# Patient Record
Sex: MALE | Race: BLACK OR AFRICAN AMERICAN | NOT HISPANIC OR LATINO | Employment: FULL TIME | URBAN - METROPOLITAN AREA
[De-identification: names, ages, dates, MRNs, and addresses within clinical notes are randomized per-mention and may not be internally consistent; named-entity substitution may affect disease eponyms.]

---

## 2022-11-21 ENCOUNTER — HOSPITAL ENCOUNTER (OUTPATIENT)
Facility: OTHER | Age: 47
Discharge: HOME | End: 2022-11-23
Attending: EMERGENCY MEDICINE | Admitting: HOSPITALIST
Payer: MEDICAID

## 2022-11-21 DIAGNOSIS — I63.81 LEFT SIDED LACUNAR STROKE: Primary | ICD-10-CM

## 2022-11-21 DIAGNOSIS — I63.9 STROKE: ICD-10-CM

## 2022-11-21 DIAGNOSIS — I10 UNCONTROLLED HYPERTENSION: ICD-10-CM

## 2022-11-21 DIAGNOSIS — I63.9 CVA (CEREBRAL VASCULAR ACCIDENT): ICD-10-CM

## 2022-11-21 DIAGNOSIS — R76.8 HEPATITIS C ANTIBODY POSITIVE IN BLOOD: ICD-10-CM

## 2022-11-21 LAB
ALBUMIN SERPL BCP-MCNC: 3.5 G/DL (ref 3.5–5.2)
ALP SERPL-CCNC: 64 U/L (ref 55–135)
ALT SERPL W/O P-5'-P-CCNC: 51 U/L (ref 10–44)
ANION GAP SERPL CALC-SCNC: 10 MMOL/L (ref 8–16)
AST SERPL-CCNC: 43 U/L (ref 10–40)
BASOPHILS # BLD AUTO: 0.04 K/UL (ref 0–0.2)
BASOPHILS NFR BLD: 0.5 % (ref 0–1.9)
BILIRUB SERPL-MCNC: 0.6 MG/DL (ref 0.1–1)
BUN SERPL-MCNC: 14 MG/DL (ref 6–20)
CALCIUM SERPL-MCNC: 8.9 MG/DL (ref 8.7–10.5)
CHLORIDE SERPL-SCNC: 106 MMOL/L (ref 95–110)
CO2 SERPL-SCNC: 25 MMOL/L (ref 23–29)
CREAT SERPL-MCNC: 1.4 MG/DL (ref 0.5–1.4)
DIFFERENTIAL METHOD: NORMAL
EOSINOPHIL # BLD AUTO: 0.1 K/UL (ref 0–0.5)
EOSINOPHIL NFR BLD: 1.3 % (ref 0–8)
ERYTHROCYTE [DISTWIDTH] IN BLOOD BY AUTOMATED COUNT: 13.2 % (ref 11.5–14.5)
EST. GFR  (NO RACE VARIABLE): >60 ML/MIN/1.73 M^2
GLUCOSE SERPL-MCNC: 104 MG/DL (ref 70–110)
HCT VFR BLD AUTO: 47.4 % (ref 40–54)
HCV AB SERPL QL IA: POSITIVE
HGB BLD-MCNC: 15.9 G/DL (ref 14–18)
HIV 1+2 AB+HIV1 P24 AG SERPL QL IA: NEGATIVE
IMM GRANULOCYTES # BLD AUTO: 0.01 K/UL (ref 0–0.04)
IMM GRANULOCYTES NFR BLD AUTO: 0.1 % (ref 0–0.5)
LYMPHOCYTES # BLD AUTO: 2.6 K/UL (ref 1–4.8)
LYMPHOCYTES NFR BLD: 30 % (ref 18–48)
MAGNESIUM SERPL-MCNC: 1.7 MG/DL (ref 1.6–2.6)
MCH RBC QN AUTO: 29 PG (ref 27–31)
MCHC RBC AUTO-ENTMCNC: 33.5 G/DL (ref 32–36)
MCV RBC AUTO: 87 FL (ref 82–98)
MONOCYTES # BLD AUTO: 0.8 K/UL (ref 0.3–1)
MONOCYTES NFR BLD: 9.5 % (ref 4–15)
NEUTROPHILS # BLD AUTO: 5.2 K/UL (ref 1.8–7.7)
NEUTROPHILS NFR BLD: 58.6 % (ref 38–73)
NRBC BLD-RTO: 0 /100 WBC
PLATELET # BLD AUTO: 159 K/UL (ref 150–450)
PMV BLD AUTO: 10.8 FL (ref 9.2–12.9)
POCT GLUCOSE: 100 MG/DL (ref 70–110)
POTASSIUM SERPL-SCNC: 3.8 MMOL/L (ref 3.5–5.1)
PROT SERPL-MCNC: 7.1 G/DL (ref 6–8.4)
RBC # BLD AUTO: 5.48 M/UL (ref 4.6–6.2)
SODIUM SERPL-SCNC: 141 MMOL/L (ref 136–145)
TROPONIN I SERPL DL<=0.01 NG/ML-MCNC: 0.02 NG/ML (ref 0–0.03)
TSH SERPL DL<=0.005 MIU/L-ACNC: 0.86 UIU/ML (ref 0.4–4)
WBC # BLD AUTO: 8.78 K/UL (ref 3.9–12.7)

## 2022-11-21 PROCEDURE — 25500020 PHARM REV CODE 255: Performed by: EMERGENCY MEDICINE

## 2022-11-21 PROCEDURE — 83735 ASSAY OF MAGNESIUM: CPT | Performed by: EMERGENCY MEDICINE

## 2022-11-21 PROCEDURE — 84484 ASSAY OF TROPONIN QUANT: CPT | Performed by: EMERGENCY MEDICINE

## 2022-11-21 PROCEDURE — 93010 EKG 12-LEAD: ICD-10-PCS | Mod: ,,, | Performed by: INTERNAL MEDICINE

## 2022-11-21 PROCEDURE — 36415 COLL VENOUS BLD VENIPUNCTURE: CPT | Performed by: EMERGENCY MEDICINE

## 2022-11-21 PROCEDURE — 93005 ELECTROCARDIOGRAM TRACING: CPT

## 2022-11-21 PROCEDURE — 84443 ASSAY THYROID STIM HORMONE: CPT | Performed by: EMERGENCY MEDICINE

## 2022-11-21 PROCEDURE — 80053 COMPREHEN METABOLIC PANEL: CPT | Performed by: EMERGENCY MEDICINE

## 2022-11-21 PROCEDURE — 86803 HEPATITIS C AB TEST: CPT | Performed by: EMERGENCY MEDICINE

## 2022-11-21 PROCEDURE — 87389 HIV-1 AG W/HIV-1&-2 AB AG IA: CPT | Performed by: EMERGENCY MEDICINE

## 2022-11-21 PROCEDURE — 25000003 PHARM REV CODE 250: Performed by: EMERGENCY MEDICINE

## 2022-11-21 PROCEDURE — 80061 LIPID PANEL: CPT | Performed by: EMERGENCY MEDICINE

## 2022-11-21 PROCEDURE — 93010 ELECTROCARDIOGRAM REPORT: CPT | Mod: ,,, | Performed by: INTERNAL MEDICINE

## 2022-11-21 PROCEDURE — 83036 HEMOGLOBIN GLYCOSYLATED A1C: CPT | Performed by: EMERGENCY MEDICINE

## 2022-11-21 PROCEDURE — 85025 COMPLETE CBC W/AUTO DIFF WBC: CPT | Performed by: EMERGENCY MEDICINE

## 2022-11-21 RX ORDER — ASPIRIN 325 MG
325 TABLET ORAL
Status: COMPLETED | OUTPATIENT
Start: 2022-11-21 | End: 2022-11-21

## 2022-11-21 RX ORDER — GADOBUTROL 604.72 MG/ML
10 INJECTION INTRAVENOUS
Status: COMPLETED | OUTPATIENT
Start: 2022-11-22 | End: 2022-11-21

## 2022-11-21 RX ORDER — ALPRAZOLAM 0.5 MG/1
0.5 TABLET ORAL
Status: DISCONTINUED | OUTPATIENT
Start: 2022-11-21 | End: 2022-11-22

## 2022-11-21 RX ORDER — CLOPIDOGREL 300 MG/1
300 TABLET, FILM COATED ORAL
Status: COMPLETED | OUTPATIENT
Start: 2022-11-21 | End: 2022-11-21

## 2022-11-21 RX ADMIN — CLOPIDOGREL BISULFATE 300 MG: 300 TABLET, FILM COATED ORAL at 11:11

## 2022-11-21 RX ADMIN — ASPIRIN 325 MG ORAL TABLET 325 MG: 325 PILL ORAL at 11:11

## 2022-11-21 RX ADMIN — GADOBUTROL 10 ML: 604.72 INJECTION INTRAVENOUS at 11:11

## 2022-11-21 RX ADMIN — IOHEXOL 100 ML: 350 INJECTION, SOLUTION INTRAVENOUS at 10:11

## 2022-11-22 PROBLEM — Z72.0 TOBACCO ABUSE: Status: ACTIVE | Noted: 2022-11-22

## 2022-11-22 PROBLEM — R29.898 RIGHT LEG WEAKNESS: Status: ACTIVE | Noted: 2022-11-22

## 2022-11-22 PROBLEM — I63.81 ACUTE THALAMIC INFARCTION: Status: ACTIVE | Noted: 2022-11-22

## 2022-11-22 PROBLEM — I10 PRIMARY HYPERTENSION: Status: ACTIVE | Noted: 2022-11-22

## 2022-11-22 LAB
APTT BLDCRRT: 30.3 SEC (ref 21–32)
AV INDEX (PROSTH): 0.81
AV MEAN GRADIENT: 5 MMHG
AV PEAK GRADIENT: 10 MMHG
AV VALVE AREA: 3.37 CM2
AV VELOCITY RATIO: 0.71
BSA FOR ECHO PROCEDURE: 2.04 M2
CHOLEST SERPL-MCNC: 172 MG/DL (ref 120–199)
CHOLEST/HDLC SERPL: 4 {RATIO} (ref 2–5)
CK MB SERPL-MCNC: 1.9 NG/ML (ref 0.1–6.5)
CK MB SERPL-RTO: 0.9 % (ref 0–5)
CK SERPL-CCNC: 201 U/L (ref 20–200)
CV ECHO LV RWT: 0.39 CM
DOP CALC AO PEAK VEL: 1.6 M/S
DOP CALC AO VTI: 29.7 CM
DOP CALC LVOT AREA: 4.2 CM2
DOP CALC LVOT DIAMETER: 2.3 CM
DOP CALC LVOT PEAK VEL: 1.13 M/S
DOP CALC LVOT STROKE VOLUME: 100.08 CM3
DOP CALCLVOT PEAK VEL VTI: 24.1 CM
E WAVE DECELERATION TIME: 428.47 MSEC
E/A RATIO: 0.53
E/E' RATIO: 18 M/S
ECHO LV POSTERIOR WALL: 0.92 CM (ref 0.6–1.1)
EJECTION FRACTION: 55 %
ESTIMATED AVG GLUCOSE: 103 MG/DL (ref 68–131)
FRACTIONAL SHORTENING: 28 % (ref 28–44)
HBA1C MFR BLD: 5.2 % (ref 4–5.6)
HDLC SERPL-MCNC: 43 MG/DL (ref 40–75)
HDLC SERPL: 25 % (ref 20–50)
INR PPP: 1 (ref 0.8–1.2)
INTERVENTRICULAR SEPTUM: 0.95 CM (ref 0.6–1.1)
IVC DIAMETER: 1.66 CM
IVRT: 95.15 MSEC
LA MAJOR: 5.99 CM
LA MINOR: 5.82 CM
LA WIDTH: 4.1 CM
LDLC SERPL CALC-MCNC: 104 MG/DL (ref 63–159)
LEFT ATRIUM SIZE: 4.7 CM
LEFT ATRIUM VOLUME INDEX MOD: 34.8 ML/M2
LEFT ATRIUM VOLUME INDEX: 47.4 ML/M2
LEFT ATRIUM VOLUME MOD: 71 CM3
LEFT ATRIUM VOLUME: 96.7 CM3
LEFT INTERNAL DIMENSION IN SYSTOLE: 3.42 CM (ref 2.1–4)
LEFT VENTRICLE DIASTOLIC VOLUME INDEX: 51.11 ML/M2
LEFT VENTRICLE DIASTOLIC VOLUME: 104.26 ML
LEFT VENTRICLE MASS INDEX: 75 G/M2
LEFT VENTRICLE SYSTOLIC VOLUME INDEX: 23.6 ML/M2
LEFT VENTRICLE SYSTOLIC VOLUME: 48.16 ML
LEFT VENTRICULAR INTERNAL DIMENSION IN DIASTOLE: 4.74 CM (ref 3.5–6)
LEFT VENTRICULAR MASS: 152.28 G
LV LATERAL E/E' RATIO: 22.5 M/S
LV SEPTAL E/E' RATIO: 15 M/S
LVOT MG: 2.66 MMHG
LVOT MV: 0.77 CM/S
MV PEAK A VEL: 0.85 M/S
MV PEAK E VEL: 0.45 M/S
MV STENOSIS PRESSURE HALF TIME: 114.03 MS
MV VALVE AREA P 1/2 METHOD: 1.93 CM2
NONHDLC SERPL-MCNC: 129 MG/DL
PISA TR MAX VEL: 2 M/S
PROTHROMBIN TIME: 10.6 SEC (ref 9–12.5)
PV PEAK VELOCITY: 1.02 CM/S
RA MAJOR: 5.74 CM
RA WIDTH: 3.8 CM
TDI LATERAL: 0.02 M/S
TDI SEPTAL: 0.03 M/S
TDI: 0.03 M/S
TR MAX PG: 16 MMHG
TRIGL SERPL-MCNC: 125 MG/DL (ref 30–150)
TROPONIN I SERPL DL<=0.01 NG/ML-MCNC: 0.02 NG/ML (ref 0–0.03)

## 2022-11-22 PROCEDURE — G0378 HOSPITAL OBSERVATION PER HR: HCPCS

## 2022-11-22 PROCEDURE — 92610 EVALUATE SWALLOWING FUNCTION: CPT

## 2022-11-22 PROCEDURE — 97165 OT EVAL LOW COMPLEX 30 MIN: CPT

## 2022-11-22 PROCEDURE — 84484 ASSAY OF TROPONIN QUANT: CPT | Performed by: NURSE PRACTITIONER

## 2022-11-22 PROCEDURE — 36415 COLL VENOUS BLD VENIPUNCTURE: CPT | Performed by: EMERGENCY MEDICINE

## 2022-11-22 PROCEDURE — 25500020 PHARM REV CODE 255: Performed by: EMERGENCY MEDICINE

## 2022-11-22 PROCEDURE — 99220 PR INITIAL OBSERVATION CARE,LEVL III: CPT | Mod: ,,, | Performed by: NURSE PRACTITIONER

## 2022-11-22 PROCEDURE — 63600175 PHARM REV CODE 636 W HCPCS: Performed by: NURSE PRACTITIONER

## 2022-11-22 PROCEDURE — 25000003 PHARM REV CODE 250: Performed by: NURSE PRACTITIONER

## 2022-11-22 PROCEDURE — 99203 PR OFFICE/OUTPT VISIT, NEW, LEVL III, 30-44 MIN: ICD-10-PCS | Mod: 95,,, | Performed by: NURSE PRACTITIONER

## 2022-11-22 PROCEDURE — A9585 GADOBUTROL INJECTION: HCPCS | Performed by: EMERGENCY MEDICINE

## 2022-11-22 PROCEDURE — 85730 THROMBOPLASTIN TIME PARTIAL: CPT | Performed by: NURSE PRACTITIONER

## 2022-11-22 PROCEDURE — 97161 PT EVAL LOW COMPLEX 20 MIN: CPT

## 2022-11-22 PROCEDURE — 85610 PROTHROMBIN TIME: CPT | Performed by: NURSE PRACTITIONER

## 2022-11-22 PROCEDURE — 97116 GAIT TRAINING THERAPY: CPT

## 2022-11-22 PROCEDURE — 99220 PR INITIAL OBSERVATION CARE,LEVL III: ICD-10-PCS | Mod: ,,, | Performed by: NURSE PRACTITIONER

## 2022-11-22 PROCEDURE — 82553 CREATINE MB FRACTION: CPT | Performed by: NURSE PRACTITIONER

## 2022-11-22 PROCEDURE — 96372 THER/PROPH/DIAG INJ SC/IM: CPT | Performed by: NURSE PRACTITIONER

## 2022-11-22 PROCEDURE — 25000003 PHARM REV CODE 250: Performed by: PHYSICIAN ASSISTANT

## 2022-11-22 PROCEDURE — 82962 GLUCOSE BLOOD TEST: CPT

## 2022-11-22 PROCEDURE — 99203 OFFICE O/P NEW LOW 30 MIN: CPT | Mod: 95,,, | Performed by: NURSE PRACTITIONER

## 2022-11-22 PROCEDURE — 99285 EMERGENCY DEPT VISIT HI MDM: CPT | Mod: 25

## 2022-11-22 RX ORDER — ENOXAPARIN SODIUM 100 MG/ML
40 INJECTION SUBCUTANEOUS EVERY 24 HOURS
Status: DISCONTINUED | OUTPATIENT
Start: 2022-11-22 | End: 2022-11-23 | Stop reason: HOSPADM

## 2022-11-22 RX ORDER — ONDANSETRON 2 MG/ML
4 INJECTION INTRAMUSCULAR; INTRAVENOUS EVERY 12 HOURS PRN
Status: DISCONTINUED | OUTPATIENT
Start: 2022-11-22 | End: 2022-11-23 | Stop reason: HOSPADM

## 2022-11-22 RX ORDER — TALC
6 POWDER (GRAM) TOPICAL NIGHTLY PRN
Status: CANCELLED | OUTPATIENT
Start: 2022-11-22

## 2022-11-22 RX ORDER — ONDANSETRON 2 MG/ML
4 INJECTION INTRAMUSCULAR; INTRAVENOUS EVERY 8 HOURS PRN
Status: CANCELLED | OUTPATIENT
Start: 2022-11-22

## 2022-11-22 RX ORDER — ATORVASTATIN CALCIUM 20 MG/1
40 TABLET, FILM COATED ORAL DAILY
Status: DISCONTINUED | OUTPATIENT
Start: 2022-11-22 | End: 2022-11-23 | Stop reason: HOSPADM

## 2022-11-22 RX ORDER — CLOPIDOGREL BISULFATE 75 MG/1
75 TABLET ORAL DAILY
Status: DISCONTINUED | OUTPATIENT
Start: 2022-11-22 | End: 2022-11-23 | Stop reason: HOSPADM

## 2022-11-22 RX ORDER — ASPIRIN 81 MG/1
81 TABLET ORAL DAILY
Status: DISCONTINUED | OUTPATIENT
Start: 2022-11-22 | End: 2022-11-23 | Stop reason: HOSPADM

## 2022-11-22 RX ORDER — AMLODIPINE BESYLATE 5 MG/1
10 TABLET ORAL DAILY
Status: DISCONTINUED | OUTPATIENT
Start: 2022-11-22 | End: 2022-11-23 | Stop reason: HOSPADM

## 2022-11-22 RX ORDER — SODIUM CHLORIDE 0.9 % (FLUSH) 0.9 %
10 SYRINGE (ML) INJECTION
Status: DISCONTINUED | OUTPATIENT
Start: 2022-11-22 | End: 2022-11-23 | Stop reason: HOSPADM

## 2022-11-22 RX ORDER — LISINOPRIL 20 MG/1
20 TABLET ORAL DAILY
Status: DISCONTINUED | OUTPATIENT
Start: 2022-11-22 | End: 2022-11-23 | Stop reason: HOSPADM

## 2022-11-22 RX ORDER — LABETALOL HYDROCHLORIDE 5 MG/ML
10 INJECTION, SOLUTION INTRAVENOUS
Status: DISCONTINUED | OUTPATIENT
Start: 2022-11-22 | End: 2022-11-23 | Stop reason: HOSPADM

## 2022-11-22 RX ADMIN — ENOXAPARIN SODIUM 40 MG: 100 INJECTION SUBCUTANEOUS at 05:11

## 2022-11-22 RX ADMIN — AMLODIPINE BESYLATE 10 MG: 5 TABLET ORAL at 04:11

## 2022-11-22 RX ADMIN — ASPIRIN 81 MG: 81 TABLET, COATED ORAL at 09:11

## 2022-11-22 RX ADMIN — CLOPIDOGREL BISULFATE 75 MG: 75 TABLET ORAL at 09:11

## 2022-11-22 RX ADMIN — ATORVASTATIN CALCIUM 40 MG: 20 TABLET, FILM COATED ORAL at 10:11

## 2022-11-22 RX ADMIN — LISINOPRIL 20 MG: 20 TABLET ORAL at 01:11

## 2022-11-22 NOTE — ED TRIAGE NOTES
Pt arrived to ED with c/o right side weakness and numbness since 7pm today. Pt reports hx of HTN, noncompliant with meds. Pt AAOx4, NAD noted.
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
## 2022-11-22 NOTE — PT/OT/SLP EVAL
Occupational Therapy   Evaluation and Discharge Note    Name: Brian Nunez  MRN: [id number]  Admitting Diagnosis:  Acute thalamic infarction   Recent Surgery: * No surgery found *      Recommendations:     Discharge Recommendations:  (Neuro Outpatient PT)  Discharge Equipment Recommendations:  cane, straight  Barriers to discharge:  None    Assessment:     Brian Nunez is a 47 y.o. male with a medical diagnosis of Acute thalamic infarction. At this time, patient is functioning at their prior level of function and does not require further acute OT services.     Plan:     During this hospitalization, patient does not require further acute OT services.  Please re-consult if situation changes.    Plan of Care Reviewed with: patient    Subjective     Chief Complaint: Impaired balance, mild tingling of right side  Patient/Family Comments/goals: To return home.    Occupational Profile:  Living Environment: Lives in [facility] with 4 steps to enter, handrail, tub/shower  Previous level of function: Indep/Mod I level, impaired balance  Roles and Routines: Lives with mother, drives for himself and mother  Equipment Used at home:  none  Assistance upon Discharge: None needed    Pain/Comfort:  Pain Rating 1: 0/10  Pain Rating Post-Intervention 1: 0/10    Patients cultural, spiritual, Bahai conflicts given the current situation: no    Objective:     Communicated with: nurse prior to session.  Patient found  standing in room  with peripheral IV upon OT entry to room.    General Precautions: Standard,     Orthopedic Precautions:N/A   Braces: N/A  Respiratory Status: Room air     Occupational Performance:    Bed Mobility:    NT    Functional Mobility/Transfers:  Sit to stand: Indep  Transfers: Mod I   Functional Mobility: No LOB during ADL mobility, decreased standing balance. Unable to stand on one leg without immediate LOB.    Activities of Daily Living:  Mod I/Indep; Educated pt on need to use shower chair if when  standing in shower and closing eyes to wash face or hair, he sways or looses his balance, recommend pt use a shower chair to reduce fall risk    Cognitive/Visual Perceptual:  Intact, pt denies any blurred vision or visual deficits    Physical Exam:  Sitting Balance: Good  Standing Balance: Static - Good; Dynamic - Fair+  UE AROM: WFL  Left UE dominant  Right UE: Shoulder 4/5; Elbow <>Hand: 5/5  Left UE: 5/5  Pt reporting only mild tingling of right side compared to when symptoms first started  Edema: None noted    AMPAC 6 Click ADL:  AMPAC Total Score: 24    Treatment & Education:  Role of OT, safety with ADL, need to monitor BP throughout the day and take medications prescribed daily, need to avoid salt, fried food and recommend pt avoid drinking sugar sodas as he states he drinks throughout the day, educated pt on more healthy alternatives to sugar sodas, discharge recs.    Patient left sitting edge of bed with all lines intact    GOALS:   Multidisciplinary Problems       Occupational Therapy Goals       Not on file              Multidisciplinary Problems (Resolved)          Problem: Occupational Therapy    Goal Priority Disciplines Outcome Interventions   Occupational Therapy Goal   (Resolved)     OT, PT/OT Met    Description: Evaluation complete.  Pt with standing balance deficits affecting functional mobility.  Recommend Outpatient PT services. No further skilled OT needed.  Recommend discharge to home setting.                        History:     No past medical history on file.    No past surgical history on file.    Time Tracking:     OT Date of Treatment: 11/22/22  OT Start Time: 1144  OT Stop Time: 1206  OT Total Time (min): 22 min    Billable Minutes:Evaluation 22 11/22/2022
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
## 2022-11-22 NOTE — PT/OT/SLP EVAL
"Physical Therapy Evaluation and Discharge Note    Patient Name:  Brian Nunez   MRN:  [id number]    Recommendations:     Discharge Recommendations:  outpatient PT (Outpatient NEURO PT)   Discharge Equipment Recommendations: cane, straight   Barriers to discharge: None    Assessment:     Brian Nunez is a 47 y.o. male admitted with a medical diagnosis of Acute thalamic infarction.   Patient lives with mother in [facility] home with 3 steps to enter. Based on the patient's MMT scores, patient with impaired R side hip flexion strength, and good BLE DF and knee extension strength noted. Patient with impaired static/ dynamic balance and SLS stance times. At this time, patient with good functional mobility and does not require further acute PT services.  DC recommendations home with outpatient PT. Discharge DME includes single point cane.     Recent Surgery: * No surgery found *      Plan:     During this hospitalization, patient does not require further acute PT services.  Please re-consult if situation changes.      Subjective     Chief Complaint: "I just feel weak and very off balance"  Patient/Family Comments/goals: Patient agrees to participate in PT intervention.   Pain/Comfort:  Pain Rating 1: 0/10    Patients cultural, spiritual, Gnosticism conflicts given the current situation: no    Living Environment:  Patient lives with mother in [facility] with 3 steps to enter and railing on one side.     Prior to admission, patients level of function was independent.  Equipment used at home: none.  DME owned (not currently used): none.  Upon discharge, patient will have assistance from none.    Objective:     Communicated with nursing prior to session.  Patient found ambulatory in room/ledezma with peripheral IV upon PT entry to room.    General Precautions: Standard, fall   Orthopedic Precautions:N/A   Braces: N/A   Respiratory Status: Room air    Exams:  Cognition:   Patient is oriented to person, place, time and " situation.  Pt follows approximately 100% of verbal commands.    Mood: Pleasant and cooperative.   Safety Awareness: good  Musculoskeletal:  BMI: 24  Posture:  slouched shoulders, forward head posture   LE ROM/Strength:   R ROM: WNL  L ROM: WNL  R Strength:   Hip flexion: 4/5  Knee extension: 5/5  Dorsiflexion: 5/5   L Strength:   Hip flexion: 5/5  Knee extension: 5/5  Dorsiflexion: 5/5   Neuromuscular:  Sensation: Intact to light touch bilateral LEs.   Tone/Reflexes: No impairments identified with functional mobility. No formal testing performed.   Balance:   Static sitting: good   Static standing: good  Dynamic standing:  fair +  Visual-vestibular: No impairments identified with functional mobility. No formal testing performed.  Integument: Visible skin intact  Cardiopulmonary:  Edema: none noted   R SLS time: unable to stand on RLE without assistance  L SLS time: 3 seconds  Static standing with eyes closed: CGA with moderate sway for 20 seconds    Functional Mobility:  Transfers:     Sit to Stand:  independence with no AD  Gait: 70 feet with SBA   Balance: good     AM-PAC 6 CLICK MOBILITY  Total Score:24       Treatment and Education:   Patient educated on POC, purpose of PT and discharge planning     Patient educated on safe and proper use of cane: step pattern with cane, hand placement of cane and height of cane.     Gait: patient with decreased BLE foot clearance, excess knee extension in stance phase. No LOB noted.     AM-PAC 6 CLICK MOBILITY  Total Score:24     Patient left ambulatory in room/ledezma with all lines intact and call button in reach.    GOALS:   Multidisciplinary Problems       Physical Therapy Goals       Not on file              Multidisciplinary Problems (Resolved)          Problem: Physical Therapy    Goal Priority Disciplines Outcome Goal Variances Interventions   Physical Therapy Goal   (Resolved)     PT, PT/OT Met                         History:     No past medical history on file.    No  past surgical history on file.    Time Tracking:     PT Received On: 11/22/22  PT Start Time: 1144     PT Stop Time: 1206  PT Total Time (min): 22 min     Billable Minutes: Evaluation 10 and Gait Training 12      11/22/2022

## 2022-11-22 NOTE — PLAN OF CARE
SW met with patient at bedside. Patient is alert and oriented with no communication barriers. Patient PCP and address was correct on face sheet. Patient pharmacy of choice is [facility] on [address]. Patient admits that he doesn't take his medication daily. Patient states that when he takes his medication, he is unable to work or function at all. Patient says that the medicine makes him feel extremely bad.   11/22/22 1123   Discharge Assessment   Assessment Type Discharge Planning Assessment   Confirmed/corrected address, phone number and insurance Yes   Source of Information patient   Lives With alone   Prior to hospitilization cognitive status: Alert/Oriented   Current cognitive status: Alert/Oriented   Dressing/Bathing Difficulty none   Equipment Currently Used at Home none   Readmission within 30 days? No   Patient currently being followed by outpatient case management? No   Do you currently have service(s) that help you manage your care at home? No   Do you take prescription medications? No   Do you have prescription coverage? Yes   Do you have any problems affording any of your prescribed medications? No   Is the patient taking medications as prescribed? no   If no, which medications is patient not taking? Patient states that when he takes his medication it makes him feel really bad.   Who is going to help you get home at discharge? Spouse   How do you get to doctors appointments? car, drives self   Are you on dialysis? No   Do you take coumadin? No   Discharge Plan A Home   DME Needed Upon Discharge  none   Discharge Barriers Identified None   Physical Activity   On average, how many days per week do you engage in moderate to strenuous exercise (like a brisk walk)? 5 days   On average, how many minutes do you engage in exercise at this level? 20 min   Financial Resource Strain   How hard is it for you to pay for the very basics like food, housing, medical care, and heating? Not hard   Housing Stability   In  the last 12 months, was there a time when you were not able to pay the mortgage or rent on time? N   In the last 12 months, was there a time when you did not have a steady place to sleep or slept in a shelter (including now)? N   Transportation Needs   In the past 12 months, has lack of transportation kept you from medical appointments or from getting medications? no   In the past 12 months, has lack of transportation kept you from meetings, work, or from getting things needed for daily living? No   Food Insecurity   Within the past 12 months, the food you bought just didn't last and you didn't have money to get more. Never true   Stress   Do you feel stress - tense, restless, nervous, or anxious, or unable to sleep at night because your mind is troubled all the time - these days? Only a littl   Social Connections   In a typical week, how many times do you talk on the phone with family, friends, or neighbors? More than 3   How often do you get together with friends or relatives? More than 3   How often do you attend Jain or Hoahaoism services? Never   Do you belong to any clubs or organizations such as Jain groups, unions, fraternal or athletic groups, or school groups? No   How often do you attend meetings of the clubs or organizations you belong to? Never   Are you , , , , never , or living with a partner?    Alcohol Use   Q1: How often do you have a drink containing alcohol? Never   Q2: How many drinks containing alcohol do you have on a typical day when you are drinking? None   Q3: How often do you have six or more drinks on one occasion? Never

## 2022-11-22 NOTE — PLAN OF CARE
Problem: SLP  Goal: SLP Goal  Description: 1. Pt will consume a regular solid diet with thin liquids without overt s/s of aspiration or airway threat during 100% of po intake without assistance.   2. Ongoing motor speech and cognitive-communicative assessment.   Outcome: Ongoing, Progressing    Speech pathology to follow 2-3x/week.

## 2022-11-22 NOTE — PLAN OF CARE
Problem: Occupational Therapy  Goal: Occupational Therapy Goal  Description: Evaluation complete.  Pt with standing balance deficits affecting functional mobility.  Recommend Outpatient PT services. No further skilled OT needed.  Recommend discharge to home setting.   Outcome: Met

## 2022-11-22 NOTE — SUBJECTIVE & OBJECTIVE
No past medical history on file.    No past surgical history on file.    Review of patient's allergies indicates:  No Known Allergies    No current facility-administered medications on file prior to encounter.     Current Outpatient Medications on File Prior to Encounter   Medication Sig    cloNIDine (CATAPRES) 0.1 MG tablet Take 2 tablets (0.2 mg total) by mouth 2 (two) times a day. for 4 days    HYDROcodone-acetaminophen (NORCO)  mg per tablet Take 1 tablet by mouth every 4 (four) hours as needed for Pain.    naproxen (NAPROSYN) 500 MG tablet Take 1 tablet (500 mg total) by mouth 2 (two) times daily with meals.     Family History    None       Tobacco Use    Smoking status: Every Day    Smokeless tobacco: Not on file   Substance and Sexual Activity    Alcohol use: Not on file    Drug use: Not on file    Sexual activity: Not on file     Review of Systems   Constitutional:  Positive for activity change and fatigue. Negative for appetite change and fever.   HENT:  Negative for congestion, ear pain and postnasal drip.    Eyes:  Negative for discharge.   Respiratory:  Negative for apnea, shortness of breath and wheezing.    Cardiovascular:  Negative for chest pain and leg swelling.   Gastrointestinal:  Negative for abdominal distention, abdominal pain, nausea and vomiting.   Endocrine: Negative for polydipsia, polyphagia and polyuria.   Genitourinary:  Negative for difficulty urinating, flank pain, frequency, hematuria and urgency.   Musculoskeletal:  Negative for arthralgias and joint swelling.   Skin:  Negative for pallor and rash.   Allergic/Immunologic: Negative for environmental allergies and food allergies.   Neurological:  Positive for numbness. Negative for dizziness, speech difficulty, weakness, light-headedness and headaches.   Hematological:  Does not bruise/bleed easily.   Psychiatric/Behavioral:  Negative for agitation.    Objective:     Vital Signs (Most Recent):  Temp: 98.8 °F (37.1 °C) (11/21/22  2110)  Pulse: 64 (11/21/22 2352)  Resp: 20 (11/21/22 2352)  BP: (!) 181/106 (11/21/22 2341)  SpO2: 96 % (11/21/22 2352)   Vital Signs (24h Range):  Temp:  [98.8 °F (37.1 °C)] 98.8 °F (37.1 °C)  Pulse:  [64-86] 64  Resp:  [16-20] 20  SpO2:  [96 %-98 %] 96 %  BP: (181-197)/(106-126) 181/106     Weight: 81.6 kg (180 lb)  Body mass index is 24.41 kg/m².    Physical Exam  Constitutional:       Appearance: Normal appearance. He is well-developed.   HENT:      Head: Normocephalic.   Eyes:      Conjunctiva/sclera: Conjunctivae normal.   Cardiovascular:      Rate and Rhythm: Normal rate and regular rhythm.      Pulses:           Radial pulses are 2+ on the right side and 2+ on the left side.      Heart sounds: Normal heart sounds.   Pulmonary:      Effort: Pulmonary effort is normal.      Breath sounds: Normal breath sounds.   Abdominal:      General: Bowel sounds are normal. There is no distension.      Palpations: Abdomen is soft.      Tenderness: There is no abdominal tenderness.   Musculoskeletal:         General: Normal range of motion.      Cervical back: Normal range of motion and neck supple.   Skin:     General: Skin is warm and dry.   Neurological:      Mental Status: He is alert and oriented to person, place, and time.      GCS: GCS eye subscore is 4. GCS verbal subscore is 5. GCS motor subscore is 6.      Motor: Motor function is intact.      Comments: Patient reports perioral numbness and right hand numbness   Psychiatric:         Mood and Affect: Mood normal.         Speech: Speech normal.         Behavior: Behavior normal.           Significant Labs: All pertinent labs within the past 24 hours have been reviewed.  CBC:   Recent Labs   Lab 11/21/22 2146   WBC 8.78   HGB 15.9   HCT 47.4        CMP:   Recent Labs   Lab 11/21/22 2146      K 3.8      CO2 25      BUN 14   CREATININE 1.4   CALCIUM 8.9   PROT 7.1   ALBUMIN 3.5   BILITOT 0.6   ALKPHOS 64   AST 43*   ALT 51*   ANIONGAP 10        Significant Imaging: I have reviewed all pertinent imaging results/findings within the past 24 hours.
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
## 2022-11-22 NOTE — ASSESSMENT & PLAN NOTE
-Stroke risk factor  -Nicotine patch PRN  -In depth discussion regarding need for smoking cessation  -Referral to smoking cessation program if available  -Encourage smoking cessation

## 2022-11-22 NOTE — PHARMACY MED REC
"  Admission Medication History     The home medication history was taken by Caitlin Moore CPHT      You may go to "Admission" then "Reconcile Home Medications" tabs to review and/or act upon these items.     The home medication list has been updated by the Pharmacy department.   Please read ALL comments highlighted in yellow.   Please address this information as you see fit.    Feel free to contact us if you have any questions or require assistance.            Patient stated that he does not take any medication at home. Although hes supposed to.        .        "
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
## 2022-11-22 NOTE — CONSULTS
46 yo male w/ PMHx of HTN and Tobacco abuse presenting w/ elevated BP and acute onset RUE paresthesias.   [facility] ED cart not available thus discussed w/ Dr. Lee over the phone.     Patient w/ NIHSS 1 for the paresthesias and no motor deficits, as per ED physicians exam.   CTH w/ L CR hypodensity as well as b/l BG lacunar infarcts, b/l thalamic hypodensities. L pontine hypodensity as well.     CTA H/N w/o LVO.     Although patient is not a TNK candidate based on isolated sensory symptoms ( non-disabling), cannot r/o and infarct at this time w/ his RF and the findings on the CTH.     Recommend admission for MRI Brain w/o contrast to evaluate for AIS.    - Allow for permissive HTNsion up to 220/110 until AIS is r/o.    - Tele-Vascular Neurology Inpatient consult recommended     Recommendations:  MRI brain to evaluate for presence and/or extent of ischemic injury  Lipid profile, A1c  Atorvastatin 40 mg for LDL <70 for secondary stroke prevention   Load aspirin 325 mg & clopidogrel 300 mg x 1 now, followed by daily aspirin 81 mg /  clopidogrel 75 mg x 30 days followed by monotherapy thereafter    Shi Masterson MD  Vascular Neurology
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
## 2022-11-22 NOTE — ASSESSMENT & PLAN NOTE
-Stroke risk factor  -SBP < 220 - can slowly resume home regiment over next 24-48 hours to slowly decrease BP  -Long term goal < 130/80 - this can be achieved over the next 3-4 weeks

## 2022-11-22 NOTE — CARE UPDATE
MRI with acute left thalamic infarct; appreciate neuro and PTOT evaluation. ASA daily and plavix x 21d; lipitor 40. PTOT recommending outpt PT. SLP cleared for regular diet. Follow up TTE. BP high today 170s/120s, reintroducing lisinopril. If some improvement in BP will dc today with neuro fu  Patient seen at bedside, plan discussed, no questions      Margo Herrera PA-C  [facility]
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
## 2022-11-22 NOTE — ASSESSMENT & PLAN NOTE
MRI/MRA- Small acute infarct within the left thalamus.  Age advanced periventricular chronic microvascular ischemic disease and multifocal small remote lacunar infarcts within the bilateral basal ganglia, right thalamus, and left aspect of the sherry.  MRA brain and neck with no significant focal stenosis or occlusion.    Consult tele-vascular  Lipid Panel/A1c  ASA/Plavix  SLP/OT/PT consult  Permissive HTN  Echo

## 2022-11-22 NOTE — SUBJECTIVE & OBJECTIVE
No past medical history on file.    No past surgical history on file.    No family history on file.    Social History     Socioeconomic History    Marital status:    Tobacco Use    Smoking status: Every Day   Social History Narrative    ** Merged History Encounter **            Current Facility-Administered Medications   Medication Dose Route Frequency Provider Last Rate Last Admin    aspirin EC tablet 81 mg  81 mg Oral Daily Raheel Randall NP   81 mg at 11/22/22 0918    atorvastatin tablet 40 mg  40 mg Oral Daily Raheel Randall NP        clopidogreL tablet 75 mg  75 mg Oral Daily Raheel Randall NP   75 mg at 11/22/22 0918    enoxaparin injection 40 mg  40 mg Subcutaneous Daily Raheel Randall NP        labetaloL injection 10 mg  10 mg Intravenous Q15 Min PRN Raheel Randall NP        ondansetron injection 4 mg  4 mg Intravenous Q12H PRN Raheel Randall NP        sodium chloride 0.9% bolus 500 mL  500 mL Intravenous Continuous PRN Raheel Randall NP        sodium chloride 0.9% flush 10 mL  10 mL Intravenous PRN Woodrow Lee MD        sodium chloride 0.9% flush 10 mL  10 mL Intravenous PRN Raheel Randall NP         Current Outpatient Medications   Medication Sig Dispense Refill    cloNIDine (CATAPRES) 0.1 MG tablet Take 2 tablets (0.2 mg total) by mouth 2 (two) times a day. for 4 days 16 tablet 0    HYDROcodone-acetaminophen (NORCO)  mg per tablet Take 1 tablet by mouth every 4 (four) hours as needed for Pain. 10 tablet 0    naproxen (NAPROSYN) 500 MG tablet Take 1 tablet (500 mg total) by mouth 2 (two) times daily with meals. 30 tablet 0     Home medications reviewed    Review of patient's allergies indicates:  No Known Allergies     Review of Systems   Constitutional:  Negative for activity change, chills and fever.   HENT:  Negative for congestion, drooling and trouble swallowing.    Eyes:  Positive for visual disturbance. Negative for photophobia and pain.    Respiratory:  Negative for cough and shortness of breath.    Cardiovascular:  Negative for chest pain, palpitations and leg swelling.   Gastrointestinal:  Negative for abdominal pain, blood in stool, diarrhea and vomiting.   Genitourinary:  Negative for difficulty urinating and hematuria.   Musculoskeletal:  Positive for gait problem. Negative for neck stiffness.   Skin:  Negative for color change and rash.   Neurological:  Positive for dizziness.   Psychiatric/Behavioral:  Negative for agitation and confusion.      Objective:     Vitals:    11/21/22 2352 11/22/22 0602 11/22/22 0618 11/22/22 0700   BP:  (!) 158/100  (!) 186/129   Pulse: 64 60 65 69   Resp: 20 18 15 15   Temp:       TempSrc:       SpO2: 96%      Weight:       Height:            Physical Exam  Constitutional:       General: He is not in acute distress.  HENT:      Head: Normocephalic and atraumatic.      Left Ear: External ear normal.      Nose: Nose normal.   Pulmonary:      Effort: Pulmonary effort is normal. No respiratory distress.   Abdominal:      General: There is no distension.      Tenderness: There is no guarding.   Musculoskeletal:      Right lower leg: No edema.      Left lower leg: No edema.   Skin:     General: Skin is dry.   Neurological:      Mental Status: He is alert.   Psychiatric:         Mood and Affect: Mood normal.         Behavior: Behavior normal.       Neurological Exam  LOC: alert  Attention Span: Good   Language: No aphasia  Articulation: No dysarthria  Orientation: Person, Place, Time   Visual Fields: Full  EOM (CN III, IV, VI): Full/intact  Facial Sensation (CN V): Normal  Facial Movement (CN VII): Symmetric nasolabial folds, some difficulty puffing cheeks and holding air - remote right Bell's palsy   Motor: Arm left    Full antigravity; Full power noted with nurse assistance  Leg left    Full antigravity; Full power noted with nurse assistance  Arm right    Full antigravity; Full power noted with nurse assistance  Leg  right   Full antigravity; Some loss of power proximally, full power distally with nurse assistance  Cerebellum: No evidence of appendicular or axial ataxia  Sensation: Intact to light touch    Diagnostic Results     Brain Imaging   11/21/2022 MRI Brain  Diffusion restriction left thalamus.  Remote lacunar infarcts left sherry, right thalamus, bilateral BG. Susceptibility right sherry, left BG.    Vessel Imaging   11/21/2022 CTA head and neck  No high-grade stenosis or occlusion.    Cardiac Imaging   TTE pending

## 2022-11-22 NOTE — DISCHARGE INSTRUCTIONS
Thank you for letting us take care of you today! It was nice meeting you, and I hope you feel better soon.     Call your primary care doctor to make the first available appointment.     Keep all your medical appointments.     Take your regular medication as prescribed. Contact your primary care provider before running out of medication, or for any problems obtaining them.    Do not drive or operate heavy machinery while taking opioid or muscle relaxing medications. Examples include norco, percocet, xanax, valium, flexeril.     Overuse or long term use of pain and sedating medication may lead to addiction, dependence, organ failure, family and work problems, legal problems, accidental overdose and death.    If you do not have health insurance, you probably can afford it:  Call [phone] ([facility]ly [facility] hotline) or go to www."[employer]    Your evaluation in the ED does not suggest any emergent or life threatening medical condition requiring admission or immediate intervention beyond that provided in the ED.   However, the signs of a serious problem sometimes take more time to appear.     Do not hesitate to return to the ER if any of the following occur:    Weakness, dizziness, fainting, or loss of consciousness   Fever of 100.4ºF (38ºC) or higher  Any worse symptoms  Any new or concerning symptoms        To protect yourself and others from COVID19:  Get vaccinated.   Anyone over 5 years old is eligible for vaccination.   Everyone 18 and older should get 3 total vaccine doses. Anyone over 50 years old or with certain chronic conditions should get a 4th dose.   Vaccination is shown to prevent getting sick, ending up in the hospital, or dying because of COVID19.   If you are vaccinated, help friends and family get the vaccine.    If not vaccinated:  Your shot is waiting for you. To get it:   Text your ZIP code to GETVAX ([id number]) or VACUNA ([id number]) in [place]  call 311, or [phone], or [phone], or [phone],    go to www.vaccines.gov, or  Call your health provider  If exposed to someone with cold, flu, or COVID19 symptoms, you must quarantine for at least 5 days.   Even if you have no symptoms   Otherwise you could give the virus to someone who dies from it  Some symptoms of COVID19 include fever, cough, sore throat, breathing troubles, loss of taste/smell, headaches, stomach upset, diarrhea.

## 2022-11-23 ENCOUNTER — TELEPHONE (OUTPATIENT)
Dept: HEPATOLOGY | Facility: CLINIC | Age: 47
End: 2022-11-23
Payer: MEDICAID

## 2022-11-23 VITALS
DIASTOLIC BLOOD PRESSURE: 107 MMHG | BODY MASS INDEX: 24.38 KG/M2 | HEIGHT: 72 IN | SYSTOLIC BLOOD PRESSURE: 161 MMHG | TEMPERATURE: 98 F | RESPIRATION RATE: 17 BRPM | HEART RATE: 64 BPM | OXYGEN SATURATION: 99 % | WEIGHT: 180 LBS

## 2022-11-23 PROBLEM — R76.8 HEPATITIS C ANTIBODY POSITIVE IN BLOOD: Status: ACTIVE | Noted: 2022-11-23

## 2022-11-23 LAB
ALBUMIN SERPL BCP-MCNC: 3.8 G/DL (ref 3.5–5.2)
ALP SERPL-CCNC: 66 U/L (ref 55–135)
ALT SERPL W/O P-5'-P-CCNC: 61 U/L (ref 10–44)
ANION GAP SERPL CALC-SCNC: 9 MMOL/L (ref 8–16)
AST SERPL-CCNC: 45 U/L (ref 10–40)
BASOPHILS # BLD AUTO: 0.02 K/UL (ref 0–0.2)
BASOPHILS NFR BLD: 0.2 % (ref 0–1.9)
BILIRUB SERPL-MCNC: 1.1 MG/DL (ref 0.1–1)
BUN SERPL-MCNC: 13 MG/DL (ref 6–20)
CALCIUM SERPL-MCNC: 9.2 MG/DL (ref 8.7–10.5)
CHLORIDE SERPL-SCNC: 109 MMOL/L (ref 95–110)
CO2 SERPL-SCNC: 24 MMOL/L (ref 23–29)
CREAT SERPL-MCNC: 1.3 MG/DL (ref 0.5–1.4)
DIFFERENTIAL METHOD: NORMAL
EOSINOPHIL # BLD AUTO: 0.1 K/UL (ref 0–0.5)
EOSINOPHIL NFR BLD: 1.3 % (ref 0–8)
ERYTHROCYTE [DISTWIDTH] IN BLOOD BY AUTOMATED COUNT: 13.1 % (ref 11.5–14.5)
EST. GFR  (NO RACE VARIABLE): >60 ML/MIN/1.73 M^2
GLUCOSE SERPL-MCNC: 95 MG/DL (ref 70–110)
HCT VFR BLD AUTO: 52.4 % (ref 40–54)
HGB BLD-MCNC: 17 G/DL (ref 14–18)
IMM GRANULOCYTES # BLD AUTO: 0.02 K/UL (ref 0–0.04)
IMM GRANULOCYTES NFR BLD AUTO: 0.2 % (ref 0–0.5)
LYMPHOCYTES # BLD AUTO: 2.4 K/UL (ref 1–4.8)
LYMPHOCYTES NFR BLD: 29 % (ref 18–48)
MAGNESIUM SERPL-MCNC: 1.8 MG/DL (ref 1.6–2.6)
MCH RBC QN AUTO: 28.4 PG (ref 27–31)
MCHC RBC AUTO-ENTMCNC: 32.4 G/DL (ref 32–36)
MCV RBC AUTO: 88 FL (ref 82–98)
MONOCYTES # BLD AUTO: 0.9 K/UL (ref 0.3–1)
MONOCYTES NFR BLD: 11 % (ref 4–15)
NEUTROPHILS # BLD AUTO: 4.9 K/UL (ref 1.8–7.7)
NEUTROPHILS NFR BLD: 58.3 % (ref 38–73)
NRBC BLD-RTO: 0 /100 WBC
PHOSPHATE SERPL-MCNC: 2.7 MG/DL (ref 2.7–4.5)
PLATELET # BLD AUTO: 253 K/UL (ref 150–450)
PMV BLD AUTO: 10.5 FL (ref 9.2–12.9)
POTASSIUM SERPL-SCNC: 4.1 MMOL/L (ref 3.5–5.1)
PROT SERPL-MCNC: 7.4 G/DL (ref 6–8.4)
RBC # BLD AUTO: 5.99 M/UL (ref 4.6–6.2)
SODIUM SERPL-SCNC: 142 MMOL/L (ref 136–145)
WBC # BLD AUTO: 8.39 K/UL (ref 3.9–12.7)

## 2022-11-23 PROCEDURE — 63600175 PHARM REV CODE 636 W HCPCS: Performed by: HOSPITALIST

## 2022-11-23 PROCEDURE — 99225 PR SUBSEQUENT OBSERVATION CARE,LEVEL II: ICD-10-PCS | Mod: ,,, | Performed by: PHYSICIAN ASSISTANT

## 2022-11-23 PROCEDURE — 85025 COMPLETE CBC W/AUTO DIFF WBC: CPT | Performed by: NURSE PRACTITIONER

## 2022-11-23 PROCEDURE — 99225 PR SUBSEQUENT OBSERVATION CARE,LEVEL II: CPT | Mod: ,,, | Performed by: PHYSICIAN ASSISTANT

## 2022-11-23 PROCEDURE — 96374 THER/PROPH/DIAG INJ IV PUSH: CPT

## 2022-11-23 PROCEDURE — 25000003 PHARM REV CODE 250: Performed by: PHYSICIAN ASSISTANT

## 2022-11-23 PROCEDURE — G0378 HOSPITAL OBSERVATION PER HR: HCPCS

## 2022-11-23 PROCEDURE — 80053 COMPREHEN METABOLIC PANEL: CPT | Performed by: NURSE PRACTITIONER

## 2022-11-23 PROCEDURE — 36415 COLL VENOUS BLD VENIPUNCTURE: CPT | Performed by: NURSE PRACTITIONER

## 2022-11-23 PROCEDURE — 84100 ASSAY OF PHOSPHORUS: CPT | Performed by: NURSE PRACTITIONER

## 2022-11-23 PROCEDURE — 83735 ASSAY OF MAGNESIUM: CPT | Performed by: NURSE PRACTITIONER

## 2022-11-23 PROCEDURE — 94761 N-INVAS EAR/PLS OXIMETRY MLT: CPT

## 2022-11-23 PROCEDURE — 25000003 PHARM REV CODE 250: Performed by: NURSE PRACTITIONER

## 2022-11-23 RX ORDER — LISINOPRIL 20 MG/1
20 TABLET ORAL DAILY
Qty: 30 TABLET | Refills: 2 | Status: SHIPPED | OUTPATIENT
Start: 2022-11-24 | End: 2023-11-24

## 2022-11-23 RX ORDER — CLOPIDOGREL BISULFATE 75 MG/1
75 TABLET ORAL DAILY
Qty: 20 TABLET | Refills: 0 | Status: SHIPPED | OUTPATIENT
Start: 2022-11-24 | End: 2022-12-14

## 2022-11-23 RX ORDER — HYDRALAZINE HYDROCHLORIDE 20 MG/ML
10 INJECTION INTRAMUSCULAR; INTRAVENOUS EVERY 6 HOURS PRN
Status: DISCONTINUED | OUTPATIENT
Start: 2022-11-23 | End: 2022-11-23

## 2022-11-23 RX ORDER — ATORVASTATIN CALCIUM 40 MG/1
40 TABLET, FILM COATED ORAL DAILY
Qty: 30 TABLET | Refills: 2 | Status: SHIPPED | OUTPATIENT
Start: 2022-11-24 | End: 2023-11-24

## 2022-11-23 RX ORDER — AMLODIPINE BESYLATE 10 MG/1
10 TABLET ORAL DAILY
Qty: 30 TABLET | Refills: 2 | Status: SHIPPED | OUTPATIENT
Start: 2022-11-24 | End: 2023-11-24

## 2022-11-23 RX ORDER — HYDRALAZINE HYDROCHLORIDE 20 MG/ML
10 INJECTION INTRAMUSCULAR; INTRAVENOUS EVERY 6 HOURS PRN
Status: DISCONTINUED | OUTPATIENT
Start: 2022-11-23 | End: 2022-11-23 | Stop reason: HOSPADM

## 2022-11-23 RX ORDER — ASPIRIN 81 MG/1
81 TABLET ORAL DAILY
Qty: 90 TABLET | Refills: 1 | Status: SHIPPED | OUTPATIENT
Start: 2022-11-24 | End: 2023-11-24

## 2022-11-23 RX ADMIN — CLOPIDOGREL BISULFATE 75 MG: 75 TABLET ORAL at 08:11

## 2022-11-23 RX ADMIN — LISINOPRIL 20 MG: 20 TABLET ORAL at 08:11

## 2022-11-23 RX ADMIN — ATORVASTATIN CALCIUM 40 MG: 20 TABLET, FILM COATED ORAL at 08:11

## 2022-11-23 RX ADMIN — ASPIRIN 81 MG: 81 TABLET, COATED ORAL at 08:11

## 2022-11-23 RX ADMIN — LABETALOL HYDROCHLORIDE 10 MG: 5 INJECTION INTRAVENOUS at 05:11

## 2022-11-23 RX ADMIN — AMLODIPINE BESYLATE 10 MG: 5 TABLET ORAL at 08:11

## 2022-11-23 RX ADMIN — HYDRALAZINE HYDROCHLORIDE 10 MG: 20 INJECTION INTRAMUSCULAR; INTRAVENOUS at 06:11

## 2022-11-23 NOTE — ASSESSMENT & PLAN NOTE
MRI/MRA- Small acute infarct within the left thalamus.  Age advanced periventricular chronic microvascular ischemic disease and multifocal small remote lacunar infarcts within the bilateral basal ganglia, right thalamus, and left aspect of the sherry.  MRA brain and neck with no significant focal stenosis or occlusion.    Consult tele-vascular  Lipid Panel/A1c: fairly well controlled  ASA/Plavix x 21d  SLP/OT/PT consult: opk for regular diet, outpt PTOT with straight cane  Permissive HTN>> resume home meds with improvement in pressure  Echo: unremarkable
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
## 2022-11-23 NOTE — PLAN OF CARE
Patient is AAOx4. Ambulates independently. Elevated blood pressure. Monitor overnight. Telemetry monitor in place. No acute events overnight. Patient resting comfortably at present. Bed locked in lowest position with side rails up x 2. Call light within reach of patient. Will continue purposeful rounding.

## 2022-11-23 NOTE — TELEPHONE ENCOUNTER
Margo Herrera PA-C ordered that patient be scheduled for a hepatology consult visit.  Patient hep c antibody positive and currently hospitalized.  No quant found.  Appt with PA Scheuermann scheduled 1/20/23; appt reminder notice mailed.

## 2022-11-23 NOTE — CONSULTS
Consulted for, Cardiac Diet education, pt to be discharged before education provided.   - attached clinical reference materials handouts to discharge documents.    On site RD to provide in-person education as warranted,   education materials to be provided with discharge instructions.      Please re-consult as needed.  Thank you.   Mona Scott, Registration Eligible, Provisional LDN

## 2022-11-23 NOTE — NURSING
Notified hospitalist of elevated BP and pulse. Will monitor and reassess in an hour as patient's pulse runs funmilayo.

## 2022-11-23 NOTE — HOSPITAL COURSE
Brian Nunez was admitted for acute CVA seen on MRI. Appreciate vascular neurology recs: ASA daily, plavix x 21d. TTE unremarkable. PTOT rec outpatient PTOT. BP improved over 24hrs of admission. Stable for dc with refills on his home BP meds, asa and plavix. Return precautions discussed, no further questions at dc.

## 2023-01-23 ENCOUNTER — TELEPHONE (OUTPATIENT)
Dept: HEPATOLOGY | Facility: CLINIC | Age: 48
End: 2023-01-23
Payer: MEDICAID

## 2023-01-23 NOTE — TELEPHONE ENCOUNTER
Patient was a no-show for scheduled appt with PA Scheuermann on 1/20/23.  No valid number listed for patient.  Letter sent asking that he call hepatology for rescheduling.

## 2024-05-16 ENCOUNTER — HOSPITAL ENCOUNTER (EMERGENCY)
Facility: OTHER | Age: 49
Discharge: HOME | End: 2024-05-16
Attending: EMERGENCY MEDICINE
Payer: COMMERCIAL

## 2024-05-16 VITALS
RESPIRATION RATE: 18 BRPM | WEIGHT: 170 LBS | DIASTOLIC BLOOD PRESSURE: 106 MMHG | SYSTOLIC BLOOD PRESSURE: 158 MMHG | BODY MASS INDEX: 22.53 KG/M2 | HEIGHT: 73 IN | HEART RATE: 64 BPM | TEMPERATURE: 98 F | OXYGEN SATURATION: 97 %

## 2024-05-16 DIAGNOSIS — R52 PAIN: ICD-10-CM

## 2024-05-16 DIAGNOSIS — M16.11 OSTEOARTHRITIS OF RIGHT HIP, UNSPECIFIED OSTEOARTHRITIS TYPE: Primary | ICD-10-CM

## 2024-05-16 DIAGNOSIS — M25.551 CHRONIC RIGHT HIP PAIN: ICD-10-CM

## 2024-05-16 DIAGNOSIS — G89.29 CHRONIC RIGHT HIP PAIN: ICD-10-CM

## 2024-05-16 LAB — HIV 1+2 AB+HIV1 P24 AG SERPL QL IA: NEGATIVE

## 2024-05-16 PROCEDURE — 87522 HEPATITIS C REVRS TRNSCRPJ: CPT | Performed by: EMERGENCY MEDICINE

## 2024-05-16 PROCEDURE — 99284 EMERGENCY DEPT VISIT MOD MDM: CPT | Mod: 25

## 2024-05-16 PROCEDURE — 25000003 PHARM REV CODE 250: Performed by: NURSE PRACTITIONER

## 2024-05-16 PROCEDURE — 87389 HIV-1 AG W/HIV-1&-2 AB AG IA: CPT | Performed by: EMERGENCY MEDICINE

## 2024-05-16 RX ORDER — MELOXICAM 7.5 MG/1
15 TABLET ORAL
Status: COMPLETED | OUTPATIENT
Start: 2024-05-16 | End: 2024-05-16

## 2024-05-16 RX ORDER — MELOXICAM 15 MG/1
15 TABLET ORAL DAILY
Qty: 30 TABLET | Refills: 0 | Status: SHIPPED | OUTPATIENT
Start: 2024-05-16

## 2024-05-16 RX ORDER — ACETAMINOPHEN 500 MG
1000 TABLET ORAL
Status: COMPLETED | OUTPATIENT
Start: 2024-05-16 | End: 2024-05-16

## 2024-05-16 RX ADMIN — MELOXICAM 15 MG: 7.5 TABLET ORAL at 03:05

## 2024-05-16 RX ADMIN — ACETAMINOPHEN 1000 MG: 500 TABLET ORAL at 03:05

## 2024-05-16 NOTE — Clinical Note
"Brian Sanchez" Enrique was seen and treated in our emergency department on 5/16/2024.  He may return to work on 05/18/2024.       If you have any questions or concerns, please don't hesitate to call.      Deborah Cazares NP"

## 2024-05-16 NOTE — DISCHARGE INSTRUCTIONS
While you are taking the meloxicam, do not take any other NSAID which would include Aleve and ibuprofen.  If you need additional pain reliever, take acetaminophen or Tylenol
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
## 2024-05-16 NOTE — ED TRIAGE NOTES
Patient presents to ED with c/o R hip pain radiating to knee for several years with worsening pain today that was unrelieved with lidocaine patch.
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
## 2024-05-17 ENCOUNTER — TELEPHONE (OUTPATIENT)
Dept: ORTHOPEDICS | Facility: CLINIC | Age: 49
End: 2024-05-17
Payer: COMMERCIAL

## 2024-05-17 LAB
HCV RNA SERPL NAA+PROBE-LOG IU: 6.5 LOGIU/ML
HCV RNA SERPL QL NAA+PROBE: DETECTED
HCV RNA SPEC NAA+PROBE-ACNC: ABNORMAL IU/ML

## 2024-05-19 DIAGNOSIS — B17.10 ACUTE HEPATITIS C VIRUS INFECTION WITHOUT HEPATIC COMA: Primary | ICD-10-CM

## 2024-05-21 ENCOUNTER — TELEPHONE (OUTPATIENT)
Dept: HEPATOLOGY | Facility: CLINIC | Age: 49
End: 2024-05-21
Payer: COMMERCIAL

## 2024-05-21 NOTE — TELEPHONE ENCOUNTER
DALY Barr ordered that patient be scheduled for hepatology consult visit for hep c.  Patient hep c quant positive.  Attempt made to reach him to setup consult visit with PA Scheuermann.  I left a VM asking that he call hepatology back.

## 2024-06-06 ENCOUNTER — PROCEDURE VISIT (OUTPATIENT)
Dept: HEPATOLOGY | Facility: CLINIC | Age: 49
End: 2024-06-06
Payer: COMMERCIAL

## 2024-06-06 ENCOUNTER — OFFICE VISIT (OUTPATIENT)
Dept: HEPATOLOGY | Facility: CLINIC | Age: 49
End: 2024-06-06
Payer: COMMERCIAL

## 2024-06-06 VITALS — HEIGHT: 73 IN | BODY MASS INDEX: 25.01 KG/M2 | WEIGHT: 188.69 LBS

## 2024-06-06 DIAGNOSIS — I10 HYPERTENSION, UNSPECIFIED TYPE: ICD-10-CM

## 2024-06-06 DIAGNOSIS — B18.2 CHRONIC HEPATITIS C WITHOUT HEPATIC COMA: Primary | ICD-10-CM

## 2024-06-06 DIAGNOSIS — B17.10 ACUTE HEPATITIS C VIRUS INFECTION WITHOUT HEPATIC COMA: ICD-10-CM

## 2024-06-06 DIAGNOSIS — B18.2 CHRONIC HEPATITIS C WITHOUT HEPATIC COMA: ICD-10-CM

## 2024-06-06 PROCEDURE — 91200 LIVER ELASTOGRAPHY: CPT | Mod: S$GLB,,, | Performed by: PHYSICIAN ASSISTANT

## 2024-06-06 PROCEDURE — 1160F RVW MEDS BY RX/DR IN RCRD: CPT | Mod: CPTII,S$GLB,, | Performed by: PHYSICIAN ASSISTANT

## 2024-06-06 PROCEDURE — 1159F MED LIST DOCD IN RCRD: CPT | Mod: CPTII,S$GLB,, | Performed by: PHYSICIAN ASSISTANT

## 2024-06-06 PROCEDURE — 99999 PR PBB SHADOW E&M-EST. PATIENT-LVL IV: CPT | Mod: PBBFAC,,, | Performed by: PHYSICIAN ASSISTANT

## 2024-06-06 PROCEDURE — 4010F ACE/ARB THERAPY RXD/TAKEN: CPT | Mod: CPTII,S$GLB,, | Performed by: PHYSICIAN ASSISTANT

## 2024-06-06 PROCEDURE — 3008F BODY MASS INDEX DOCD: CPT | Mod: CPTII,S$GLB,, | Performed by: PHYSICIAN ASSISTANT

## 2024-06-06 PROCEDURE — 99203 OFFICE O/P NEW LOW 30 MIN: CPT | Mod: S$GLB,,, | Performed by: PHYSICIAN ASSISTANT
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
## 2024-06-07 ENCOUNTER — TELEPHONE (OUTPATIENT)
Dept: HEPATOLOGY | Facility: CLINIC | Age: 49
End: 2024-06-07
Payer: COMMERCIAL

## 2024-06-07 NOTE — TELEPHONE ENCOUNTER
Attempt made to reach patient for preferences unsuccessful.  He scheduled US on 7/1/24.  Labs scheduled that day and f/u appt with PA Scheuermann scheduled 7/12/24; appt reminder notices mailed.  Appt info left on his VM.

## 2024-06-07 NOTE — PROCEDURES
FibroScan Other Locations    Date/Time: 6/6/2024 4:15 PM    Performed by: Scheuermann, Jennifer B., PA  Authorized by: Scheuermann, Jennifer B., PA    Diagnosis:  HCV    Probe:  M    Universal Protocol: Patient's identity, procedure and site were verified, confirmatory pause was performed.  Discussed procedure including risks and potential complications.  Questions answered.  Patient verbalizes understanding and wishes to proceed with VCTE.     Procedure: After providing explanations of the procedure, patient was placed in the supine position with right arm in maximum abduction to allow optimal exposure of right lateral abdomen.  Patient was briefly assessed, Testing was performed in the mid-axillary location, 50Hz Shear Wave pulses were applied and the resulting Shear Wave and Propagation Speed detected with a 3.5 MHz ultrasonic signal, using the FibroScan probe, Skin to liver capsule distance and liver parenchyma were accessed during the entire examination with the FibroScan probe, Patient was instructed to breathe normally and to abstain from sudden movements during the procedure, allowing for random measurements of liver stiffness. At least 10 Shear Waves were produced, Individual measurements of each Shear Wave were calculated.  Patient tolerated the procedure well with no complications.  Meets discharge criteria as was dismissed.  Rates pain 0 out of 10.  Patient will follow up with ordering provider to review results.    Findings  Median liver stiffness score:  9  CAP Reading: dB/m:  216    IQR/med %:  8  Interpretation  Fibrosis interpretation is based on medial liver stiffness - Kilopascal (kPa).    Fibrosis Stage:  F2  Steatosis interpretation is based on controlled attenuation parameter - (dB/m).    Steatosis Grade:  <S1

## 2024-06-07 NOTE — TELEPHONE ENCOUNTER
I saw pt yest but appears all that I ordered was not done    Pls schedule:  Labs  Visit to review all results and start HCV Rx    (Labs need to be done prior to visit, not day of)

## 2024-07-01 ENCOUNTER — HOSPITAL ENCOUNTER (OUTPATIENT)
Dept: RADIOLOGY | Facility: HOSPITAL | Age: 49
Discharge: HOME | End: 2024-07-01
Attending: PHYSICIAN ASSISTANT
Payer: COMMERCIAL

## 2024-07-01 ENCOUNTER — TELEPHONE (OUTPATIENT)
Dept: HEPATOLOGY | Facility: CLINIC | Age: 49
End: 2024-07-01
Payer: COMMERCIAL

## 2024-07-01 DIAGNOSIS — B18.2 CHRONIC HEPATITIS C WITHOUT HEPATIC COMA: ICD-10-CM

## 2024-07-01 PROCEDURE — 76705 ECHO EXAM OF ABDOMEN: CPT | Mod: 26,,, | Performed by: INTERNAL MEDICINE

## 2024-07-01 PROCEDURE — 76705 ECHO EXAM OF ABDOMEN: CPT | Mod: TC

## 2024-07-01 NOTE — TELEPHONE ENCOUNTER
----- Message from Valentine Rodriguez RN sent at 7/1/2024 10:41 AM CDT -----  Patient came to our office asking for refills on Lisinopril and Mobic.  It was stressed that he needed to f/u with primary care, not liver provider for refills.  Attempt made to set patient up for an urgent visit with PCP today since he was in the building at [facility] and had been out of the meds, he states for a while.  Patient stated that he already called for a uber ride home and did not want an appt today.  He states that he would have to comeback to [facility] on 7/8/24 because of his work schedule.  He asked that I schedule him for a morning appt on that day.  Primary care contact and appt with Galilea Goodman NP scheduled.  Attempt made to contact patient again with appt info.  He did not answer so info left on his VM and appt reminder notice mailed.

## 2024-07-08 ENCOUNTER — OFFICE VISIT (OUTPATIENT)
Dept: INTERNAL MEDICINE | Facility: CLINIC | Age: 49
End: 2024-07-08
Payer: COMMERCIAL

## 2024-07-08 VITALS
HEIGHT: 73 IN | HEART RATE: 64 BPM | BODY MASS INDEX: 24.95 KG/M2 | OXYGEN SATURATION: 98 % | DIASTOLIC BLOOD PRESSURE: 100 MMHG | SYSTOLIC BLOOD PRESSURE: 162 MMHG | WEIGHT: 188.25 LBS

## 2024-07-08 DIAGNOSIS — I10 PRIMARY HYPERTENSION: Primary | ICD-10-CM

## 2024-07-08 DIAGNOSIS — I10 HYPERTENSION, UNSPECIFIED TYPE: ICD-10-CM

## 2024-07-08 DIAGNOSIS — M25.551 RIGHT HIP PAIN: ICD-10-CM

## 2024-07-08 DIAGNOSIS — Z86.73 HISTORY OF MULTIPLE CEREBROVASCULAR ACCIDENTS (CVAS): ICD-10-CM

## 2024-07-08 DIAGNOSIS — T30.0 BURN: ICD-10-CM

## 2024-07-08 DIAGNOSIS — B19.20 HEPATITIS C VIRUS INFECTION WITHOUT HEPATIC COMA, UNSPECIFIED CHRONICITY: ICD-10-CM

## 2024-07-08 PROBLEM — I63.81 ACUTE THALAMIC INFARCTION: Status: RESOLVED | Noted: 2022-11-22 | Resolved: 2024-07-08

## 2024-07-08 PROBLEM — I63.81 MULTIPLE LACUNAR INFARCTS: Status: ACTIVE | Noted: 2023-12-17

## 2024-07-08 PROCEDURE — 99204 OFFICE O/P NEW MOD 45 MIN: CPT | Mod: S$GLB,,, | Performed by: NURSE PRACTITIONER

## 2024-07-08 PROCEDURE — 99999 PR PBB SHADOW E&M-EST. PATIENT-LVL IV: CPT | Mod: PBBFAC,,, | Performed by: NURSE PRACTITIONER

## 2024-07-08 PROCEDURE — 3008F BODY MASS INDEX DOCD: CPT | Mod: CPTII,S$GLB,, | Performed by: NURSE PRACTITIONER

## 2024-07-08 PROCEDURE — 3080F DIAST BP >= 90 MM HG: CPT | Mod: CPTII,S$GLB,, | Performed by: NURSE PRACTITIONER

## 2024-07-08 PROCEDURE — 1159F MED LIST DOCD IN RCRD: CPT | Mod: CPTII,S$GLB,, | Performed by: NURSE PRACTITIONER

## 2024-07-08 PROCEDURE — 3077F SYST BP >= 140 MM HG: CPT | Mod: CPTII,S$GLB,, | Performed by: NURSE PRACTITIONER

## 2024-07-08 PROCEDURE — 4010F ACE/ARB THERAPY RXD/TAKEN: CPT | Mod: CPTII,S$GLB,, | Performed by: NURSE PRACTITIONER

## 2024-07-08 RX ORDER — MELOXICAM 15 MG/1
15 TABLET ORAL DAILY
Qty: 30 TABLET | Refills: 0 | Status: CANCELLED | OUTPATIENT
Start: 2024-07-08

## 2024-07-08 RX ORDER — CLOPIDOGREL BISULFATE 75 MG/1
75 TABLET ORAL DAILY
Qty: 30 TABLET | Refills: 0 | Status: SHIPPED | OUTPATIENT
Start: 2024-07-08 | End: 2024-08-07

## 2024-07-08 RX ORDER — AMLODIPINE BESYLATE 5 MG/1
5 TABLET ORAL DAILY
Qty: 90 TABLET | Refills: 0 | Status: SHIPPED | OUTPATIENT
Start: 2024-07-08 | End: 2025-07-08

## 2024-07-08 RX ORDER — ACETAMINOPHEN AND CODEINE PHOSPHATE 300; 30 MG/1; MG/1
1 TABLET ORAL EVERY 8 HOURS PRN
Qty: 21 TABLET | Refills: 0 | Status: SHIPPED | OUTPATIENT
Start: 2024-07-08 | End: 2024-07-15

## 2024-07-08 RX ORDER — SILVER SULFADIAZINE 10 G/1000G
CREAM TOPICAL DAILY
Qty: 50 G | Refills: 0 | Status: SHIPPED | OUTPATIENT
Start: 2024-07-08 | End: 2024-07-15

## 2024-07-08 RX ORDER — LISINOPRIL AND HYDROCHLOROTHIAZIDE 12.5; 2 MG/1; MG/1
1 TABLET ORAL DAILY
Qty: 90 TABLET | Refills: 0 | Status: SHIPPED | OUTPATIENT
Start: 2024-07-08 | End: 2025-07-08

## 2024-07-08 RX ORDER — ASPIRIN 81 MG/1
81 TABLET ORAL DAILY
Qty: 90 TABLET | Refills: 1 | Status: SHIPPED | OUTPATIENT
Start: 2024-07-08 | End: 2025-07-08

## 2024-07-08 RX ORDER — ATORVASTATIN CALCIUM 40 MG/1
40 TABLET, FILM COATED ORAL DAILY
Qty: 30 TABLET | Refills: 2 | Status: SHIPPED | OUTPATIENT
Start: 2024-07-08 | End: 2024-07-08

## 2024-07-08 RX ORDER — CLONIDINE HYDROCHLORIDE 0.1 MG/1
0.1 TABLET ORAL
Status: COMPLETED | OUTPATIENT
Start: 2024-07-08 | End: 2024-07-08

## 2024-07-08 RX ADMIN — CLONIDINE HYDROCHLORIDE 0.1 MG: 0.1 TABLET ORAL at 09:07

## 2024-07-12 ENCOUNTER — TELEPHONE (OUTPATIENT)
Dept: HEPATOLOGY | Facility: CLINIC | Age: 49
End: 2024-07-12
Payer: COMMERCIAL

## 2024-07-15 ENCOUNTER — TELEPHONE (OUTPATIENT)
Dept: HEPATOLOGY | Facility: CLINIC | Age: 49
End: 2024-07-15
Payer: COMMERCIAL

## 2024-07-15 NOTE — TELEPHONE ENCOUNTER
Patient was a no-show for scheduled appt with PA Scheuermann on 7/12/24.  Attempt made to reach him for rescheduling.  I left a VM and sent a letter asking that he call hepatology back.

## 2024-08-06 ENCOUNTER — TELEPHONE (OUTPATIENT)
Dept: PAIN MEDICINE | Facility: CLINIC | Age: 49
End: 2024-08-06
Payer: COMMERCIAL

## 2024-10-17 DIAGNOSIS — I10 PRIMARY HYPERTENSION: ICD-10-CM

## 2024-10-17 NOTE — TELEPHONE ENCOUNTER
----- Message from Nyjrosi sent at 10/17/2024 10:00 AM CDT -----  Regarding: Refill  Contact: [phone]  Type:  RX Refill Request    Who Called: Patient  Refill or New Rx:Refill  RX Name and Strength:lisinopriL-hydrochlorothiazide (PRINZIDE,ZESTORETIC) 20-12.5 mg per tablet   How is the patient currently taking it? (ex. 1XDay):  Is this a 30 day or 90 day RX:  [facility] with phone number[id number] [id number] [id number] AT [address]  Local or Mail Order:Local  Ordering Provider:Mireille Mills NP  Would the patient rather a call back or a response via [facility]? Call Back  Best Call Back Number:[phone]  Additional Information:   ;

## 2024-10-18 RX ORDER — LISINOPRIL AND HYDROCHLOROTHIAZIDE 12.5; 2 MG/1; MG/1
1 TABLET ORAL DAILY
Qty: 30 TABLET | Refills: 0 | Status: SHIPPED | OUTPATIENT
Start: 2024-10-18 | End: 2025-10-18

## 2024-10-18 NOTE — TELEPHONE ENCOUNTER
Will only give 1 camilo supply. He is overdue for follow up. He needs to establish with Dr. Robertson. Thanks

## 2025-01-14 DIAGNOSIS — I10 PRIMARY HYPERTENSION: ICD-10-CM

## 2025-01-14 RX ORDER — LISINOPRIL AND HYDROCHLOROTHIAZIDE 12.5; 2 MG/1; MG/1
1 TABLET ORAL DAILY
Qty: 30 TABLET | Refills: 0 | Status: SHIPPED | OUTPATIENT
Start: 2025-01-14 | End: 2025-01-27 | Stop reason: SDUPTHER

## 2025-01-14 NOTE — TELEPHONE ENCOUNTER
----- Message from Anabell sent at 1/14/2025 10:56 AM CST -----  Regarding: Pharmacy Authorization-Blood Pressure Medication  1/14/2025      Hello,    I received a call from BRIDGET NUNEZY CATARINA [[phone]] regarding his prescription for his blood pressure medication. Mr. Nunez did not have the name of the medication. Please give him a call. He can be reached at [phone].     Thank you,   Anabell PEDROZA   Access Navigator

## 2025-01-27 ENCOUNTER — OFFICE VISIT (OUTPATIENT)
Dept: INTERNAL MEDICINE | Facility: CLINIC | Age: 50
End: 2025-01-27
Payer: COMMERCIAL

## 2025-01-27 ENCOUNTER — TELEPHONE (OUTPATIENT)
Dept: HEPATOLOGY | Facility: CLINIC | Age: 50
End: 2025-01-27
Payer: COMMERCIAL

## 2025-01-27 VITALS
SYSTOLIC BLOOD PRESSURE: 156 MMHG | HEIGHT: 73 IN | BODY MASS INDEX: 25.86 KG/M2 | DIASTOLIC BLOOD PRESSURE: 102 MMHG | OXYGEN SATURATION: 98 % | WEIGHT: 195.13 LBS | HEART RATE: 63 BPM

## 2025-01-27 DIAGNOSIS — G89.29 OTHER CHRONIC PAIN: ICD-10-CM

## 2025-01-27 DIAGNOSIS — I10 PRIMARY HYPERTENSION: Primary | ICD-10-CM

## 2025-01-27 DIAGNOSIS — M16.51 POST-TRAUMATIC OSTEOARTHRITIS OF RIGHT HIP: ICD-10-CM

## 2025-01-27 DIAGNOSIS — B18.2 CHRONIC HEPATITIS C WITHOUT HEPATIC COMA: ICD-10-CM

## 2025-01-27 DIAGNOSIS — K04.7 TOOTH INFECTION: ICD-10-CM

## 2025-01-27 DIAGNOSIS — Z86.73 HISTORY OF MULTIPLE CEREBROVASCULAR ACCIDENTS (CVAS): ICD-10-CM

## 2025-01-27 PROCEDURE — 3008F BODY MASS INDEX DOCD: CPT | Mod: CPTII,S$GLB,, | Performed by: NURSE PRACTITIONER

## 2025-01-27 PROCEDURE — 1159F MED LIST DOCD IN RCRD: CPT | Mod: CPTII,S$GLB,, | Performed by: NURSE PRACTITIONER

## 2025-01-27 PROCEDURE — 4010F ACE/ARB THERAPY RXD/TAKEN: CPT | Mod: CPTII,S$GLB,, | Performed by: NURSE PRACTITIONER

## 2025-01-27 PROCEDURE — 99999 PR PBB SHADOW E&M-EST. PATIENT-LVL V: CPT | Mod: PBBFAC,,, | Performed by: NURSE PRACTITIONER

## 2025-01-27 PROCEDURE — 3077F SYST BP >= 140 MM HG: CPT | Mod: CPTII,S$GLB,, | Performed by: NURSE PRACTITIONER

## 2025-01-27 PROCEDURE — 99214 OFFICE O/P EST MOD 30 MIN: CPT | Mod: S$GLB,,, | Performed by: NURSE PRACTITIONER

## 2025-01-27 PROCEDURE — 3080F DIAST BP >= 90 MM HG: CPT | Mod: CPTII,S$GLB,, | Performed by: NURSE PRACTITIONER

## 2025-01-27 PROCEDURE — G2211 COMPLEX E/M VISIT ADD ON: HCPCS | Mod: S$GLB,,, | Performed by: NURSE PRACTITIONER

## 2025-01-27 RX ORDER — LISINOPRIL AND HYDROCHLOROTHIAZIDE 12.5; 2 MG/1; MG/1
1 TABLET ORAL DAILY
Qty: 90 TABLET | Refills: 1 | Status: SHIPPED | OUTPATIENT
Start: 2025-01-27 | End: 2026-01-27

## 2025-01-27 RX ORDER — CLOPIDOGREL BISULFATE 75 MG/1
75 TABLET ORAL DAILY
Qty: 30 TABLET | Refills: 5 | Status: SHIPPED | OUTPATIENT
Start: 2025-01-27 | End: 2025-02-26

## 2025-01-27 RX ORDER — ACETAMINOPHEN AND CODEINE PHOSPHATE 300; 30 MG/1; MG/1
1 TABLET ORAL EVERY 8 HOURS PRN
Qty: 15 TABLET | Refills: 0 | Status: SHIPPED | OUTPATIENT
Start: 2025-01-27 | End: 2025-02-01

## 2025-01-27 RX ORDER — ATORVASTATIN CALCIUM 40 MG/1
40 TABLET, FILM COATED ORAL DAILY
Qty: 90 TABLET | Refills: 1 | Status: SHIPPED | OUTPATIENT
Start: 2025-01-27 | End: 2026-01-27

## 2025-01-27 RX ORDER — IBUPROFEN 600 MG/1
600 TABLET ORAL EVERY 8 HOURS PRN
Qty: 30 TABLET | Refills: 0 | Status: SHIPPED | OUTPATIENT
Start: 2025-01-27 | End: 2025-01-27

## 2025-01-27 RX ORDER — AMLODIPINE BESYLATE 5 MG/1
5 TABLET ORAL DAILY
Qty: 90 TABLET | Refills: 3 | Status: SHIPPED | OUTPATIENT
Start: 2025-01-27 | End: 2026-01-27

## 2025-01-27 RX ORDER — ASPIRIN 81 MG/1
81 TABLET ORAL DAILY
Qty: 90 TABLET | Refills: 1 | Status: SHIPPED | OUTPATIENT
Start: 2025-01-27 | End: 2026-01-27

## 2025-01-27 RX ORDER — AMOXICILLIN 500 MG/1
500 CAPSULE ORAL EVERY 8 HOURS
Qty: 21 CAPSULE | Refills: 0 | Status: SHIPPED | OUTPATIENT
Start: 2025-01-27 | End: 2025-02-03

## 2025-01-27 NOTE — TELEPHONE ENCOUNTER
Brielle Mills NP ordered that patient f/u with hepatology for hep c.  Patient last seen in 2024 by PA Scheuermann.  Attempt made to reach him for scheduling.  Unable to reach or leave a VM on cell number listed.  I left a VM on home number listed asking that patient call for scheduling.
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
## 2025-02-03 ENCOUNTER — TELEPHONE (OUTPATIENT)
Dept: HEPATOLOGY | Facility: CLINIC | Age: 50
End: 2025-02-03
Payer: COMMERCIAL

## 2025-02-03 NOTE — TELEPHONE ENCOUNTER
No response from patient.  Attempt made to reach patient again this week.  I left a VM and sent a letter asking that he contact hepatology to setup f/u visit with PA Scheuermann.

## 2025-02-03 NOTE — TELEPHONE ENCOUNTER
----- Message from Jennifer Scheuermann, PA sent at 1/30/2025  5:16 PM CST -----  Pt seen in June. Had testing but no show for f/u appt  PCP just referred him back and forwarded note to me  Pls call to schedule appt    thx  ----- Message -----  From: Mireille Mills NP  Sent: 1/27/2025  11:07 AM CST  To: Jennifer B. Scheuermann, PA

## 2025-02-05 NOTE — TELEPHONE ENCOUNTER
No response.  Attempt made to reach him again.  I left a VM asking that he call hepatology for scheduling.  I spoke with his mom (Isabel) at the 2nd number listed in his chart.  She took a msg and stated that she would have patient to call back for scheduling.

## 2025-02-06 ENCOUNTER — TELEPHONE (OUTPATIENT)
Dept: PHARMACY | Facility: CLINIC | Age: 50
End: 2025-02-06
Payer: COMMERCIAL

## 2025-02-06 NOTE — TELEPHONE ENCOUNTER
No response from patient.  Attempt made to reach him again for scheduling.  I left a VM and sent a letter asking that he call back.

## 2025-02-06 NOTE — TELEPHONE ENCOUNTER
[facility]/Population [facility] Chart Review & Patient Outreach Details For Medication Adherence Project    Reason for Outreach Encounter: 3rd Party payor non-compliance report ([employer], [employer], C, etc)  2.  Patient Outreach Method: Reviewed patient chart   3.   Medication in question:    Hypertension Medications               amLODIPine (NORVASC) 5 MG tablet Take 1 tablet (5 mg total) by mouth once daily.    lisinopriL-hydrochlorothiazide (PRINZIDE,ZESTORETIC) 20-12.5 mg per tablet Take 1 tablet by mouth once daily.                   last filled  1/27/25 for 90 day supply      4.  Reviewed and or Updates Made To: Patient Chart  5. Outreach Outcomes and/or actions taken: Patient filled medication and is on track to be adherent  Additional Notes:

## 2025-02-10 ENCOUNTER — HOSPITAL ENCOUNTER (OUTPATIENT)
Dept: RADIOLOGY | Facility: HOSPITAL | Age: 50
Discharge: HOME | End: 2025-02-10
Attending: INTERNAL MEDICINE
Payer: COMMERCIAL

## 2025-02-10 ENCOUNTER — OFFICE VISIT (OUTPATIENT)
Dept: INTERNAL MEDICINE | Facility: CLINIC | Age: 50
End: 2025-02-10
Payer: COMMERCIAL

## 2025-02-10 VITALS
BODY MASS INDEX: 26.43 KG/M2 | SYSTOLIC BLOOD PRESSURE: 138 MMHG | DIASTOLIC BLOOD PRESSURE: 77 MMHG | HEART RATE: 68 BPM | WEIGHT: 195.13 LBS | OXYGEN SATURATION: 99 % | HEIGHT: 72 IN

## 2025-02-10 DIAGNOSIS — R35.0 URINARY FREQUENCY: ICD-10-CM

## 2025-02-10 DIAGNOSIS — I10 PRIMARY HYPERTENSION: Primary | ICD-10-CM

## 2025-02-10 DIAGNOSIS — B36.0 TINEA VERSICOLOR: ICD-10-CM

## 2025-02-10 DIAGNOSIS — M25.551 RIGHT HIP PAIN: ICD-10-CM

## 2025-02-10 DIAGNOSIS — B18.2 CHRONIC HEPATITIS C WITHOUT HEPATIC COMA: ICD-10-CM

## 2025-02-10 PROCEDURE — 3075F SYST BP GE 130 - 139MM HG: CPT | Mod: CPTII,S$GLB,, | Performed by: INTERNAL MEDICINE

## 2025-02-10 PROCEDURE — 99999 PR PBB SHADOW E&M-EST. PATIENT-LVL IV: CPT | Mod: PBBFAC,,, | Performed by: INTERNAL MEDICINE

## 2025-02-10 PROCEDURE — 73502 X-RAY EXAM HIP UNI 2-3 VIEWS: CPT | Mod: TC,RT

## 2025-02-10 PROCEDURE — 3078F DIAST BP <80 MM HG: CPT | Mod: CPTII,S$GLB,, | Performed by: INTERNAL MEDICINE

## 2025-02-10 PROCEDURE — 99214 OFFICE O/P EST MOD 30 MIN: CPT | Mod: S$GLB,,, | Performed by: INTERNAL MEDICINE

## 2025-02-10 PROCEDURE — 73502 X-RAY EXAM HIP UNI 2-3 VIEWS: CPT | Mod: 26,RT,, | Performed by: RADIOLOGY

## 2025-02-10 PROCEDURE — 4010F ACE/ARB THERAPY RXD/TAKEN: CPT | Mod: CPTII,S$GLB,, | Performed by: INTERNAL MEDICINE

## 2025-02-10 PROCEDURE — 3008F BODY MASS INDEX DOCD: CPT | Mod: CPTII,S$GLB,, | Performed by: INTERNAL MEDICINE

## 2025-02-10 RX ORDER — LISINOPRIL 20 MG/1
20 TABLET ORAL DAILY
Qty: 90 TABLET | Refills: 3 | Status: SHIPPED | OUTPATIENT
Start: 2025-02-10 | End: 2026-02-10

## 2025-02-10 RX ORDER — CLOTRIMAZOLE 1 %
CREAM (GRAM) TOPICAL 2 TIMES DAILY
Qty: 45 G | Refills: 3 | Status: SHIPPED | OUTPATIENT
Start: 2025-02-10
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
## 2025-02-21 ENCOUNTER — TELEPHONE (OUTPATIENT)
Dept: SPORTS MEDICINE | Facility: CLINIC | Age: 50
End: 2025-02-21
Payer: COMMERCIAL

## 2025-03-03 ENCOUNTER — OFFICE VISIT (OUTPATIENT)
Dept: SPORTS MEDICINE | Facility: CLINIC | Age: 50
End: 2025-03-03
Payer: COMMERCIAL

## 2025-03-03 VITALS
WEIGHT: 198 LBS | SYSTOLIC BLOOD PRESSURE: 161 MMHG | BODY MASS INDEX: 26.85 KG/M2 | HEART RATE: 75 BPM | DIASTOLIC BLOOD PRESSURE: 104 MMHG

## 2025-03-03 DIAGNOSIS — M16.51 POST-TRAUMATIC OSTEOARTHRITIS OF RIGHT HIP: Primary | ICD-10-CM

## 2025-03-03 DIAGNOSIS — M25.551 RIGHT HIP PAIN: ICD-10-CM

## 2025-03-03 PROCEDURE — 99999 PR PBB SHADOW E&M-EST. PATIENT-LVL III: CPT | Mod: PBBFAC,,, | Performed by: ORTHOPAEDIC SURGERY

## 2025-03-03 PROCEDURE — 3077F SYST BP >= 140 MM HG: CPT | Mod: CPTII,S$GLB,, | Performed by: ORTHOPAEDIC SURGERY

## 2025-03-03 PROCEDURE — 1159F MED LIST DOCD IN RCRD: CPT | Mod: CPTII,S$GLB,, | Performed by: ORTHOPAEDIC SURGERY

## 2025-03-03 PROCEDURE — 1160F RVW MEDS BY RX/DR IN RCRD: CPT | Mod: CPTII,S$GLB,, | Performed by: ORTHOPAEDIC SURGERY

## 2025-03-03 PROCEDURE — 4010F ACE/ARB THERAPY RXD/TAKEN: CPT | Mod: CPTII,S$GLB,, | Performed by: ORTHOPAEDIC SURGERY

## 2025-03-03 PROCEDURE — 99204 OFFICE O/P NEW MOD 45 MIN: CPT | Mod: S$GLB,,, | Performed by: ORTHOPAEDIC SURGERY

## 2025-03-03 PROCEDURE — 3008F BODY MASS INDEX DOCD: CPT | Mod: CPTII,S$GLB,, | Performed by: ORTHOPAEDIC SURGERY

## 2025-03-03 PROCEDURE — 3080F DIAST BP >= 90 MM HG: CPT | Mod: CPTII,S$GLB,, | Performed by: ORTHOPAEDIC SURGERY

## 2025-03-03 RX ORDER — METHOCARBAMOL 500 MG/1
500 TABLET, FILM COATED ORAL 3 TIMES DAILY PRN
Qty: 30 TABLET | Refills: 0 | Status: SHIPPED | OUTPATIENT
Start: 2025-03-03
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
## 2025-03-19 ENCOUNTER — TELEPHONE (OUTPATIENT)
Dept: ORTHOPEDICS | Facility: CLINIC | Age: 50
End: 2025-03-19
Payer: COMMERCIAL

## 2025-03-19 NOTE — TELEPHONE ENCOUNTER
Attempted to call patient to confirm appointment date and time. No answer. Left details on voicemail.

## 2025-03-20 ENCOUNTER — OFFICE VISIT (OUTPATIENT)
Dept: ORTHOPEDICS | Facility: CLINIC | Age: 50
End: 2025-03-20
Payer: COMMERCIAL

## 2025-03-20 VITALS — BODY MASS INDEX: 26.06 KG/M2 | HEIGHT: 72 IN | WEIGHT: 192.38 LBS

## 2025-03-20 DIAGNOSIS — B18.2 CHRONIC HEPATITIS C WITHOUT HEPATIC COMA: ICD-10-CM

## 2025-03-20 DIAGNOSIS — M12.551 TRAUMATIC ARTHROPATHY OF RIGHT HIP: Primary | ICD-10-CM

## 2025-03-20 DIAGNOSIS — M25.551 RIGHT HIP PAIN: ICD-10-CM

## 2025-03-20 PROCEDURE — 1159F MED LIST DOCD IN RCRD: CPT | Mod: CPTII,S$GLB,, | Performed by: STUDENT IN AN ORGANIZED HEALTH CARE EDUCATION/TRAINING PROGRAM

## 2025-03-20 PROCEDURE — 99214 OFFICE O/P EST MOD 30 MIN: CPT | Mod: S$GLB,,, | Performed by: STUDENT IN AN ORGANIZED HEALTH CARE EDUCATION/TRAINING PROGRAM

## 2025-03-20 PROCEDURE — 4010F ACE/ARB THERAPY RXD/TAKEN: CPT | Mod: CPTII,S$GLB,, | Performed by: STUDENT IN AN ORGANIZED HEALTH CARE EDUCATION/TRAINING PROGRAM

## 2025-03-20 PROCEDURE — 3008F BODY MASS INDEX DOCD: CPT | Mod: CPTII,S$GLB,, | Performed by: STUDENT IN AN ORGANIZED HEALTH CARE EDUCATION/TRAINING PROGRAM

## 2025-03-20 PROCEDURE — 99999 PR PBB SHADOW E&M-EST. PATIENT-LVL III: CPT | Mod: PBBFAC,,, | Performed by: STUDENT IN AN ORGANIZED HEALTH CARE EDUCATION/TRAINING PROGRAM

## 2025-03-20 PROCEDURE — 1160F RVW MEDS BY RX/DR IN RCRD: CPT | Mod: CPTII,S$GLB,, | Performed by: STUDENT IN AN ORGANIZED HEALTH CARE EDUCATION/TRAINING PROGRAM

## 2025-03-20 RX ORDER — METHOCARBAMOL 500 MG/1
500 TABLET, FILM COATED ORAL 3 TIMES DAILY PRN
Qty: 60 TABLET | Refills: 0 | Status: SHIPPED | OUTPATIENT
Start: 2025-03-20

## 2025-03-20 RX ORDER — SULINDAC 200 MG/1
200 TABLET ORAL 2 TIMES DAILY WITH MEALS
Qty: 60 TABLET | Refills: 0 | Status: SHIPPED | OUTPATIENT
Start: 2025-03-20

## 2025-03-20 NOTE — Clinical Note
Rolf Kinney, I saw Mr. Nunez today for his hip - he has severe arthritis after a fracture 20 years ago. He is interested in total hip replacement, but his Hep C needs to be treated first. I have him the phone number for Alixbeny Lux ([phone]) and told him to call ASAP to get scheduled for treatment. I also told him he would need clearance from Hepatology showing completion of treatment prior to surgery, in order to reduce his infection risk perioperatively. If you could have your office reach out to him once more to try to schedule, that would be great. Unfortunately he seems to be under the impression that he was already treated (maybe he thought another medication was for Hep C, I'm not sure?).  Also, do you know how long his treatment course would be? Thanks! -Nicolas Valladares Orthopaedic Surgery Hip and Knee Replacement

## 2025-03-20 NOTE — LETTER
March 20, 2025      [facility] 5th Fl  [address], [address]  [facility] [address]  Phone: [phone]       Patient: Brian Nunez   YOB: 1975  Date of Visit: 03/20/2025    To Whom It May Concern:    Umu Nunez  was at [facility] on 03/20/2025. The patient may return to work on 3/21 with no restrictions. If you have any questions or concerns, or if I can be of further assistance, please do not hesitate to contact me.    Sincerely,    Nicolas Valladares MD  Orthopaedic Surgery  Hip and Knee Replacement
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
## 2025-03-21 ENCOUNTER — TELEPHONE (OUTPATIENT)
Dept: HEPATOLOGY | Facility: CLINIC | Age: 50
End: 2025-03-21
Payer: COMMERCIAL

## 2025-03-21 NOTE — TELEPHONE ENCOUNTER
----- Message from Nicolas Valladares MD sent at 3/20/2025  6:19 PM CDT -----  Rolf Kinney,  I saw Mr. Nunez today for his hip - he has severe arthritis after a fracture 20 years ago. He is interested in total hip replacement, but his Hep C needs to be treated first. I have him the phone number for Alix Lux ([phone]) and told him to call ASAP to get scheduled for treatment. I also told him he would need clearance from Hepatology showing completion of treatment prior to surgery, in order to reduce his infection risk perioperatively.  If you could have your office reach out to him once more to try to schedule, that would be great. Unfortunately he seems to be under the impression that he was already treated (maybe he thought another medication was for Hep C, I'm not sure?).   Also, do you know how long his treatment course would be?  Thanks!  -Nicolas Valladares  Orthopaedic Surgery  Hip and Knee Replacement

## 2025-03-21 NOTE — TELEPHONE ENCOUNTER
Per [employer] insurance claim check: no claims for HCV meds in past year    HCV RNA (+) 7/2024    Luz Adam pls try again to schedule pt for appt    thx

## 2025-03-21 NOTE — TELEPHONE ENCOUNTER
Attempt made to reach patient.  Msg from PA Scheuermann left on his VM.  I also sent a letter asking that he call for scheduling.

## 2025-04-07 ENCOUNTER — LAB VISIT (OUTPATIENT)
Dept: LAB | Facility: HOSPITAL | Age: 50
End: 2025-04-07
Payer: COMMERCIAL

## 2025-04-07 DIAGNOSIS — B18.2 CHRONIC HEPATITIS C WITHOUT HEPATIC COMA: ICD-10-CM

## 2025-04-07 DIAGNOSIS — I10 PRIMARY HYPERTENSION: ICD-10-CM

## 2025-04-07 DIAGNOSIS — M12.551 TRAUMATIC ARTHROPATHY OF RIGHT HIP: ICD-10-CM

## 2025-04-07 LAB
ABSOLUTE EOSINOPHIL (OHS): 0.05 K/UL
ABSOLUTE MONOCYTE (OHS): 0.83 K/UL (ref 0.3–1)
ABSOLUTE NEUTROPHIL COUNT (OHS): 3.96 K/UL (ref 1.8–7.7)
ALBUMIN SERPL BCP-MCNC: 3.5 G/DL (ref 3.5–5.2)
ALP SERPL-CCNC: 80 UNIT/L (ref 40–150)
ALT SERPL W/O P-5'-P-CCNC: 108 UNIT/L (ref 10–44)
ANION GAP (OHS): 10 MMOL/L (ref 8–16)
AST SERPL-CCNC: 88 UNIT/L (ref 11–45)
BASOPHILS # BLD AUTO: 0.02 K/UL
BASOPHILS NFR BLD AUTO: 0.3 %
BILIRUB SERPL-MCNC: 0.6 MG/DL (ref 0.1–1)
BUN SERPL-MCNC: 17 MG/DL (ref 6–20)
CALCIUM SERPL-MCNC: 8.6 MG/DL (ref 8.7–10.5)
CHLORIDE SERPL-SCNC: 108 MMOL/L (ref 95–110)
CHOLEST SERPL-MCNC: 131 MG/DL (ref 120–199)
CHOLEST/HDLC SERPL: 2.5 {RATIO} (ref 2–5)
CO2 SERPL-SCNC: 22 MMOL/L (ref 23–29)
CREAT SERPL-MCNC: 0.9 MG/DL (ref 0.5–1.4)
CRP SERPL-MCNC: <0.3 MG/L
ERYTHROCYTE [DISTWIDTH] IN BLOOD BY AUTOMATED COUNT: 13.8 % (ref 11.5–14.5)
ERYTHROCYTE [SEDIMENTATION RATE] IN BLOOD BY PHOTOMETRIC METHOD: 21 MM/HR
GFR SERPLBLD CREATININE-BSD FMLA CKD-EPI: >60 ML/MIN/1.73/M2
GLUCOSE SERPL-MCNC: 91 MG/DL (ref 70–110)
HCT VFR BLD AUTO: 46.3 % (ref 40–54)
HDLC SERPL-MCNC: 53 MG/DL (ref 40–75)
HDLC SERPL: 40.5 % (ref 20–50)
HGB BLD-MCNC: 14.7 GM/DL (ref 14–18)
IMM GRANULOCYTES # BLD AUTO: 0.02 K/UL (ref 0–0.04)
IMM GRANULOCYTES NFR BLD AUTO: 0.3 % (ref 0–0.5)
LDLC SERPL CALC-MCNC: 63.8 MG/DL (ref 63–159)
LYMPHOCYTES # BLD AUTO: 2.67 K/UL (ref 1–4.8)
MCH RBC QN AUTO: 28.8 PG (ref 27–31)
MCHC RBC AUTO-ENTMCNC: 31.7 G/DL (ref 32–36)
MCV RBC AUTO: 91 FL (ref 82–98)
NONHDLC SERPL-MCNC: 78 MG/DL
NUCLEATED RBC (/100WBC) (OHS): 0 /100 WBC
PLATELET # BLD AUTO: 250 K/UL (ref 150–450)
PMV BLD AUTO: 10.9 FL (ref 9.2–12.9)
POTASSIUM SERPL-SCNC: 4 MMOL/L (ref 3.5–5.1)
PROT SERPL-MCNC: 7.4 GM/DL (ref 6–8.4)
RBC # BLD AUTO: 5.11 M/UL (ref 4.6–6.2)
RELATIVE EOSINOPHIL (OHS): 0.7 %
RELATIVE LYMPHOCYTE (OHS): 35.4 % (ref 18–48)
RELATIVE MONOCYTE (OHS): 11 % (ref 4–15)
RELATIVE NEUTROPHIL (OHS): 52.3 % (ref 38–73)
SODIUM SERPL-SCNC: 140 MMOL/L (ref 136–145)
TRIGL SERPL-MCNC: 71 MG/DL (ref 30–150)
TSH SERPL-ACNC: 1.24 UIU/ML (ref 0.4–4)
WBC # BLD AUTO: 7.55 K/UL (ref 3.9–12.7)

## 2025-04-07 PROCEDURE — 86140 C-REACTIVE PROTEIN: CPT

## 2025-04-07 PROCEDURE — 36415 COLL VENOUS BLD VENIPUNCTURE: CPT

## 2025-04-07 PROCEDURE — 85652 RBC SED RATE AUTOMATED: CPT

## 2025-04-07 PROCEDURE — 85025 COMPLETE CBC W/AUTO DIFF WBC: CPT

## 2025-04-07 PROCEDURE — 80061 LIPID PANEL: CPT

## 2025-04-07 PROCEDURE — 82374 ASSAY BLOOD CARBON DIOXIDE: CPT

## 2025-04-07 PROCEDURE — 84443 ASSAY THYROID STIM HORMONE: CPT

## 2025-04-09 ENCOUNTER — TELEPHONE (OUTPATIENT)
Dept: INTERNAL MEDICINE | Facility: CLINIC | Age: 50
End: 2025-04-09
Payer: COMMERCIAL

## 2025-04-09 ENCOUNTER — RESULTS FOLLOW-UP (OUTPATIENT)
Dept: INTERNAL MEDICINE | Facility: CLINIC | Age: 50
End: 2025-04-09

## 2025-04-09 NOTE — TELEPHONE ENCOUNTER
----- Message from Mireille Mills NP sent at 4/9/2025  7:59 AM CDT -----  Please let pt know labs overall are stable but liver enzymes are still elevated. He needs to contact hepatology again for Hep C treatment. Looks like they've reached out to him and haven't been about   to contact him.   ----- Message -----  From: Lab, Background User  Sent: 4/7/2025   1:29 PM CDT  To: Mireille Mills NP

## 2025-04-09 NOTE — TELEPHONE ENCOUNTER
Pt was called his mom answered the phone and stated that she would have him call back about lab results

## 2025-04-22 RX ORDER — SULINDAC 200 MG/1
200 TABLET ORAL 2 TIMES DAILY WITH MEALS
Qty: 60 TABLET | Refills: 0 | Status: CANCELLED | OUTPATIENT
Start: 2025-04-22

## 2025-04-25 ENCOUNTER — TELEPHONE (OUTPATIENT)
Dept: HEPATOLOGY | Facility: CLINIC | Age: 50
End: 2025-04-25
Payer: COMMERCIAL

## 2025-04-25 ENCOUNTER — OFFICE VISIT (OUTPATIENT)
Dept: ORTHOPEDICS | Facility: CLINIC | Age: 50
End: 2025-04-25
Payer: COMMERCIAL

## 2025-04-25 VITALS — WEIGHT: 194.44 LBS | HEIGHT: 72 IN | BODY MASS INDEX: 26.34 KG/M2

## 2025-04-25 DIAGNOSIS — B18.2 CHRONIC HEPATITIS C WITHOUT HEPATIC COMA: ICD-10-CM

## 2025-04-25 DIAGNOSIS — M12.551 TRAUMATIC ARTHROPATHY OF RIGHT HIP: Primary | ICD-10-CM

## 2025-04-25 PROCEDURE — 99999 PR PBB SHADOW E&M-EST. PATIENT-LVL III: CPT | Mod: PBBFAC,,, | Performed by: STUDENT IN AN ORGANIZED HEALTH CARE EDUCATION/TRAINING PROGRAM

## 2025-04-25 RX ORDER — SULINDAC 200 MG/1
200 TABLET ORAL 2 TIMES DAILY WITH MEALS
Qty: 20 TABLET | Refills: 0 | Status: SHIPPED | OUTPATIENT
Start: 2025-04-25

## 2025-04-25 NOTE — PATIENT INSTRUCTIONS
Contact Valentine Lux at hepatology clinic ([phone]) to schedule an appointment to be treated for your liver infection. You must receive treatment for the liver before we can schedule a hip replacement surgery.
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
## 2025-04-25 NOTE — Clinical Note
Rolf Grijalva, Could you please call Mr. Nunez on Monday to schedule his appt? He is off work that day, and I told him I would message you to request that you contact him then.  Thanks! -Nicolas Valladares

## 2025-04-25 NOTE — TELEPHONE ENCOUNTER
Attempt made to reach patient at both numbers listed.  I left a VM asking that he call hepatology back to setup EP appt with PA Scheuermann.

## 2025-04-25 NOTE — TELEPHONE ENCOUNTER
----- Message from Jennifer sent at 4/25/2025  3:08 PM CDT -----  Regarding: Appt  Contact: Pt  [phone]  Appt Type: Est Pt  Date/Time Preference:  Next tirso  Treating Provider: Scheuermann, Jennifer   Caller Name:  Nicolas Valladares MD Contact Prefer:  [phone]/Notes:  Called to schedule appt on pt's behalf

## 2025-04-28 ENCOUNTER — TELEPHONE (OUTPATIENT)
Dept: HEPATOLOGY | Facility: CLINIC | Age: 50
End: 2025-04-28
Payer: COMMERCIAL

## 2025-04-28 ENCOUNTER — LAB VISIT (OUTPATIENT)
Dept: LAB | Facility: HOSPITAL | Age: 50
End: 2025-04-28
Payer: COMMERCIAL

## 2025-04-28 ENCOUNTER — OFFICE VISIT (OUTPATIENT)
Dept: HEPATOLOGY | Facility: CLINIC | Age: 50
End: 2025-04-28
Payer: COMMERCIAL

## 2025-04-28 VITALS — HEIGHT: 72 IN | BODY MASS INDEX: 26.37 KG/M2 | WEIGHT: 194.69 LBS

## 2025-04-28 DIAGNOSIS — R74.8 ABNORMAL TRANSAMINASES: ICD-10-CM

## 2025-04-28 DIAGNOSIS — B18.2 CHRONIC HEPATITIS C WITHOUT HEPATIC COMA: ICD-10-CM

## 2025-04-28 DIAGNOSIS — B18.2 CHRONIC HEPATITIS C WITHOUT HEPATIC COMA: Primary | ICD-10-CM

## 2025-04-28 PROCEDURE — 1159F MED LIST DOCD IN RCRD: CPT | Mod: CPTII,S$GLB,, | Performed by: PHYSICIAN ASSISTANT

## 2025-04-28 PROCEDURE — 87522 HEPATITIS C REVRS TRNSCRPJ: CPT

## 2025-04-28 PROCEDURE — 36415 COLL VENOUS BLD VENIPUNCTURE: CPT

## 2025-04-28 PROCEDURE — 3008F BODY MASS INDEX DOCD: CPT | Mod: CPTII,S$GLB,, | Performed by: PHYSICIAN ASSISTANT

## 2025-04-28 PROCEDURE — 99215 OFFICE O/P EST HI 40 MIN: CPT | Mod: S$GLB,,, | Performed by: PHYSICIAN ASSISTANT

## 2025-04-28 PROCEDURE — 99999 PR PBB SHADOW E&M-EST. PATIENT-LVL III: CPT | Mod: PBBFAC,,, | Performed by: PHYSICIAN ASSISTANT

## 2025-04-28 PROCEDURE — 4010F ACE/ARB THERAPY RXD/TAKEN: CPT | Mod: CPTII,S$GLB,, | Performed by: PHYSICIAN ASSISTANT

## 2025-04-28 PROCEDURE — 1160F RVW MEDS BY RX/DR IN RCRD: CPT | Mod: CPTII,S$GLB,, | Performed by: PHYSICIAN ASSISTANT

## 2025-04-28 RX ORDER — VELPATASVIR AND SOFOSBUVIR 100; 400 MG/1; MG/1
1 TABLET, FILM COATED ORAL DAILY
Qty: 28 TABLET | Refills: 2 | Status: ACTIVE | OUTPATIENT
Start: 2025-04-28

## 2025-04-28 RX ORDER — LISINOPRIL AND HYDROCHLOROTHIAZIDE 12.5; 2 MG/1; MG/1
1 TABLET ORAL
COMMUNITY
Start: 2025-04-26 | End: 2025-04-28

## 2025-04-28 NOTE — TELEPHONE ENCOUNTER
----- Message from Nicolas Valladares MD sent at 4/25/2025  5:11 PM CDT -----  Rolf Grijalva,  Could you please call Mr. Nunez on Monday to schedule his appt? He is off work that day, and I told him I would message you to request that you contact him then.   Thanks!  -Nicolas Valladares
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
## 2025-04-29 ENCOUNTER — TELEPHONE (OUTPATIENT)
Dept: ORTHOPEDICS | Facility: CLINIC | Age: 50
End: 2025-04-29
Payer: COMMERCIAL

## 2025-04-29 ENCOUNTER — RESULTS FOLLOW-UP (OUTPATIENT)
Dept: HEPATOLOGY | Facility: CLINIC | Age: 50
End: 2025-04-29

## 2025-04-29 ENCOUNTER — TELEPHONE (OUTPATIENT)
Dept: HEPATOLOGY | Facility: CLINIC | Age: 50
End: 2025-04-29
Payer: COMMERCIAL

## 2025-04-29 LAB
HCV RNA SERPL NAA+PROBE-ACNC: ABNORMAL IU/ML
HCV RNA SERPL NAA+PROBE-LOG IU: 7.27 LOG IU/ML
HCV RNA SERPL NAA+PROBE-LOG IU: DETECTED {LOG_IU}/ML

## 2025-04-29 NOTE — LETTER
April 29, 2025    Brian Nunez  [address] [address]         [facility] [address]  [address] [facility] [address]  Phone: [phone] April 29, 2025     Patient: Brian Nunez   YOB: 1975   Date of Visit: 4/29/2025       To Whom It May Concern:    It is my medical opinion that Brian Nunez may remain out of work at this time while he is awaiting medical optimization for his total joint replacement.    If you have any questions or concerns, please don't hesitate to call.    Sincerely,    Nicolas Valladares MD  Orthopaedic Surgery  Hip and Knee Replacement

## 2025-04-29 NOTE — TELEPHONE ENCOUNTER
Returned the patient's call.  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  He is requesting a letter for work stating he is unable to work at this time.  Informed patient I would send an informal letter through his patient portal but to let us know when he has obtained disability paperwork through his employer to be completed.    Patient understood and was appreciative    Maddie Polo MS, ATC, OTC  Clinical / Surgical Assistant to Dr. Nicolas Valladares  [facility]  O: [phone]  F: [phone]      ----- Message from Nicolas Valladares MD sent at 4/29/2025  1:20 PM CDT -----  Regarding: FW: PLEASE CALL PT    ----- Message -----  From: Yessi Kearney MA  Sent: 4/28/2025   8:59 AM CDT  To: Nicolas Valladares MD  Subject: PLEASE CALL PT                                   Good morning, spoke to this patient today and he is requesting a phone call from you to today. He wants to talk about increased pain and disability paperwork. I informed him you are in surgery at the moment but he wants to discuss medication and documentation for disability that is needed.

## 2025-05-14 ENCOUNTER — TELEPHONE (OUTPATIENT)
Dept: PHARMACY | Facility: CLINIC | Age: 50
End: 2025-05-14
Payer: COMMERCIAL

## 2025-05-14 NOTE — TELEPHONE ENCOUNTER
[facility]/Population Health Chart Review & Patient Outreach Details For Medication Adherence Project    Reason for Outreach Encounter: 3rd Party payor non-compliance report ([employer], [employer], C, etc)  2.  Patient Outreach Method: Reviewed Patient Chart  3.   Medication in question: lisinopril    LAST FILLED: 5/13/25 for 90 day supply  Hypertension Medications              amLODIPine (NORVASC) 5 MG tablet Take 1 tablet (5 mg total) by mouth once daily.    lisinopriL (PRINIVIL,ZESTRIL) 20 MG tablet Take 1 tablet (20 mg total) by mouth once daily.              4.  Reviewed and or Updates Made To: Patient Chart  5. Outreach Outcomes and/or actions taken: Patient filled medication and is on track to be adherent

## 2025-05-19 ENCOUNTER — TELEPHONE (OUTPATIENT)
Dept: HEPATOLOGY | Facility: CLINIC | Age: 50
End: 2025-05-19
Payer: COMMERCIAL

## 2025-05-19 NOTE — TELEPHONE ENCOUNTER
Patient called.  He states that [facility] still has not shipped because med [employer] is not in place.  I spoke with staff at [facility] and they state that they have the following SANTINO # for patient [id number] and are receiving a rejections.  I did give the [facility]  SANTINO [phone].  Please advise.

## 2025-05-20 RX ORDER — SULINDAC 200 MG/1
200 TABLET ORAL 2 TIMES DAILY WITH MEALS
Qty: 60 TABLET | Refills: 0 | Status: SHIPPED | OUTPATIENT
Start: 2025-05-20

## 2025-05-20 NOTE — TELEPHONE ENCOUNTER
Reached out to [facility] and spoke with rep Justin who confirms PA approval is on file and has been escalated for processing. He states they will reach out to the patient once the medication is ready to schedule. Requested that this be expedited as Rx was sent on 4/30/25. Rep confirms they will try to expedite.

## 2025-05-31 ENCOUNTER — TELEPHONE (OUTPATIENT)
Dept: PHARMACY | Facility: CLINIC | Age: 50
End: 2025-05-31
Payer: COMMERCIAL

## 2025-06-01 NOTE — TELEPHONE ENCOUNTER
[facility]Population Health Chart Review & Patient Outreach Details For Medication Adherence Project    Reason for Outreach Encounter: 3rd Party payor non-compliance report ([employer], [employer], C, etc)  2.  Patient Outreach Method: Reviewed Patient Chart  3.   Medication in question: atorvastatin    LAST FILLED: 4/25/25 for 90 day supply  Hyperlipidemia Medications              atorvastatin (LIPITOR) 40 MG tablet Take 1 tablet (40 mg total) by mouth once daily.               4.  Reviewed and or Updates Made To: Patient Chart  5. Outreach Outcomes and/or actions taken: Patient filled medication and is on track to be adherent

## 2025-07-11 ENCOUNTER — TELEPHONE (OUTPATIENT)
Dept: HEPATOLOGY | Facility: CLINIC | Age: 50
End: 2025-07-11
Payer: COMMERCIAL

## 2025-07-11 DIAGNOSIS — B18.2 CHRONIC HEPATITIS C WITHOUT HEPATIC COMA: Primary | ICD-10-CM

## 2025-07-11 NOTE — TELEPHONE ENCOUNTER
Pt beginning 12 weeks Epclusa on 6/7/25  **missed 1 week of meds due to issues w/ refill from [facility]  New anticipated treatment end date: 9/4/25  F 2-3  [place]isha 1A  Prior HCV treatment: ?? Naive or possibly 4 wks of oral regimen in past??        Pls update episode and schedule:  - LFT, HCV RNA in 1 week  - LFT, HCV RNA - SVR12 - 12/4/25

## 2025-07-11 NOTE — TELEPHONE ENCOUNTER
----- Message from GARRETT Grijalva sent at 7/10/2025  2:18 PM CDT -----  I spoke with patient.  He started Epclusa on 6/7/25 and took the last tab from shipment # 1 on 7/4/25.  He is currently out of med but will received 2nd shipment on 7/11/25 from [facility].  I stressed that he call [phone] 1 week before 2nd shipment runs out for refill.  Please provide lab orders.  SVR date will need to be adjusted by 1 wk.

## 2025-07-14 ENCOUNTER — TELEPHONE (OUTPATIENT)
Dept: HEPATOLOGY | Facility: CLINIC | Age: 50
End: 2025-07-14
Payer: COMMERCIAL

## 2025-07-14 NOTE — TELEPHONE ENCOUNTER
I spoke with patient.  He did not get his Epclusa shipment on 7/11.  I spoke with [facility].  Patient provided them with new insurance info which was incorrect.  I insisted that they run insurance ID number for SANTINO originally sent to them and PA # provided.  They got a paid claim and state that they will ship to patient today.  It was stressed that patient has now missed 10 doses of med and needs shipment ASAP.   I spoke with patient again and asked that he contact [facility] to setup shipment.

## 2025-07-16 NOTE — TELEPHONE ENCOUNTER
Patient called.  He still has not received 2nd shipment of Epclusa from [facility].  He took last dose of drug on 7/4/25.  I spoke with Anmol farah at [facility] in management.  She states that auth is in place and they are receiving a paid claim but now shipment has to take place and process could take up to 48 hours.    I spoke with patient and the above relayed.  I reassured him that PA Scheuermann would monitor how his virus responds and we will either add on an extra month to this therapy or prescribe another medicine for re-treatment if his virus returns. Patient states that he will call be back if he has not heard anything from [facility] by Friday at noon.

## 2025-07-22 ENCOUNTER — LAB VISIT (OUTPATIENT)
Dept: LAB | Facility: HOSPITAL | Age: 50
End: 2025-07-22
Payer: COMMERCIAL

## 2025-07-22 ENCOUNTER — TELEPHONE (OUTPATIENT)
Dept: HEPATOLOGY | Facility: CLINIC | Age: 50
End: 2025-07-22
Payer: COMMERCIAL

## 2025-07-22 DIAGNOSIS — B18.2 CHRONIC HEPATITIS C WITHOUT HEPATIC COMA: ICD-10-CM

## 2025-07-22 LAB
ALBUMIN SERPL BCP-MCNC: 4.3 G/DL (ref 3.5–5.2)
ALP SERPL-CCNC: 68 UNIT/L (ref 40–150)
ALT SERPL W/O P-5'-P-CCNC: 35 UNIT/L (ref 10–44)
AST SERPL-CCNC: 36 UNIT/L (ref 11–45)
BILIRUB DIRECT SERPL-MCNC: 0.3 MG/DL (ref 0.1–0.3)
BILIRUB SERPL-MCNC: 0.6 MG/DL (ref 0.1–1)
PROT SERPL-MCNC: 8 GM/DL (ref 6–8.4)

## 2025-07-22 PROCEDURE — 36415 COLL VENOUS BLD VENIPUNCTURE: CPT

## 2025-07-22 PROCEDURE — 87522 HEPATITIS C REVRS TRNSCRPJ: CPT

## 2025-07-22 PROCEDURE — 82247 BILIRUBIN TOTAL: CPT

## 2025-07-22 NOTE — TELEPHONE ENCOUNTER
Copied from CRM #[id number]. Topic: Medications - Medication Question  >> Jul 22, 2025 11:18 AM Sotero Bejarano wrote:  Type:  Pharmacy Calling to Clarify an RX    Name of Caller:Harvey  Pharmacy Name:[facility] (- [facility] )  Prescription Name:EPCLUSA 400-100 mg Tab  What do they need to clarify?: drug interaction  Best Call Back Number:[phone]

## 2025-07-23 LAB
HCV RNA SERPL NAA+PROBE-ACNC: ABNORMAL IU/ML
HCV RNA SERPL NAA+PROBE-LOG IU: 5.86 LOG IU/ML
HCV RNA SERPL NAA+PROBE-LOG IU: DETECTED {LOG_IU}/ML

## 2025-07-24 ENCOUNTER — RESULTS FOLLOW-UP (OUTPATIENT)
Dept: HEPATOLOGY | Facility: CLINIC | Age: 50
End: 2025-07-24
Payer: COMMERCIAL

## 2025-07-24 ENCOUNTER — TELEPHONE (OUTPATIENT)
Dept: HEPATOLOGY | Facility: CLINIC | Age: 50
End: 2025-07-24
Payer: COMMERCIAL

## 2025-07-24 DIAGNOSIS — B18.2 CHRONIC HEPATITIS C WITHOUT HEPATIC COMA: ICD-10-CM

## 2025-07-24 DIAGNOSIS — B18.2 CHRONIC HEPATITIS C WITHOUT HEPATIC COMA: Primary | ICD-10-CM

## 2025-07-24 NOTE — TELEPHONE ENCOUNTER
Pt began 12 weeks Epclusa on 6/7/25  **many missed doses due to issue w/ [facility] delivery  New anticipated treatment end date: 9/4/25 ->will need to be redetermined  F 2-3  L[place]isha 1A  Prior HCV treatment: ?? Naive or possibly 4 wks of oral regimen in past??        7/22  AST/ALT now normal  ,967 (from > 18 mill)    Pls call pt  Tell him liver labs are now normal. This is good.  Hep c virus level has dropped considerably from > 18 mill to 700,000  Has he gotten his shipment of epclusa from [facility] yet? We need to know when he restarts treatment to change labs    Next labs:  - HCV RNA - pls schedule in 3 weeks  - LFT, HCV RNA - SVR12 - 12/4/25 --> will need to adjust date

## 2025-07-24 NOTE — TELEPHONE ENCOUNTER
I spoke with patient and msg from PA Scheuermann relayed.  He states that [facility] has contacted him and he should receive shipment this evening and will take a dose today.  Lab draw scheduled 8/14/25; appt reminder notice mailed.

## 2025-07-25 ENCOUNTER — PATIENT OUTREACH (OUTPATIENT)
Dept: ADMINISTRATIVE | Facility: HOSPITAL | Age: 50
End: 2025-07-25
Payer: COMMERCIAL

## 2025-07-25 NOTE — PROGRESS NOTES
"HM and immunization's reviewed and updated.  Health Maintenance Due   Topic Date Due    TETANUS VACCINE  Never done    Pneumococcal Vaccines (Age 0-49) (1 of 2 - PCV) Never done    Colorectal Cancer Screening  Never done    COVID-19 Vaccine (3 - 2024-25 season) 09/01/2024     IF HEALTH MAINTENANCE ALREADY DONE, PLEASE RECORDS INFORMATION.  COMM. GAP REPORT: "BLOOD PRESSURE CONTROL." -need remote blood pressure. Sent [facility] message.   Due colon screening.      Meeta Cross MA  Panel Care Coordinator  [facility]  [phone]  For Antonia Robertson MD  "

## 2025-08-06 ENCOUNTER — PATIENT OUTREACH (OUTPATIENT)
Dept: ADMINISTRATIVE | Facility: HOSPITAL | Age: 50
End: 2025-08-06
Payer: COMMERCIAL

## 2025-08-06 NOTE — PROGRESS NOTES
"HM and immunization's reviewed and updated.  Health Maintenance Due   Topic Date Due    TETANUS VACCINE  Never done    Pneumococcal Vaccines (Age 0-49) (1 of 2 - PCV) Never done    Colorectal Cancer Screening  Never done    COVID-19 Vaccine (3 - 2024-25 season) 09/01/2024     IF HEALTH MAINTENANCE ALREADY DONE, PLEASE RECORDS INFORMATION.  COMM. GAP REPORT: "BLOOD PRESSURE CONTROL." -need remote blood pressure. Sent [facility] message.  No response.  Patient will call back or send through portal.  Due colon screening. - attempt to notified information, patient hang up the phone.         Meeta Crsos MA  Panel Care Coordinator  [facility]  [phone]  For Antonia Robertson MD  "

## 2025-08-07 ENCOUNTER — TELEPHONE (OUTPATIENT)
Dept: PHARMACY | Facility: CLINIC | Age: 50
End: 2025-08-07

## 2025-08-07 NOTE — TELEPHONE ENCOUNTER
[facility]/Population Health Chart Review & Patient Outreach Details For Medication Adherence Project    Reason for Outreach Encounter: 3rd Party payor non-compliance report ([employer], [employer]BS, [employer], etc)  2.  Patient Outreach Method: Reviewed Patient Chart  3.   Medication in question: atorvastatin    LAST FILLED: n/a  Hyperlipidemia Medications              atorvastatin (LIPITOR) 40 MG tablet Take 1 tablet (40 mg total) by mouth once daily.               4.  Reviewed and or Updates Made To: Patient Chart  5. Outreach Outcomes and/or actions taken: Medication discontinued    Additional Notes:

## 2025-08-14 ENCOUNTER — LAB VISIT (OUTPATIENT)
Dept: LAB | Facility: HOSPITAL | Age: 50
End: 2025-08-14
Attending: INTERNAL MEDICINE
Payer: MEDICAID

## 2025-08-14 DIAGNOSIS — B18.2 CHRONIC HEPATITIS C WITHOUT HEPATIC COMA: ICD-10-CM

## 2025-08-14 PROCEDURE — 87522 HEPATITIS C REVRS TRNSCRPJ: CPT

## 2025-08-14 PROCEDURE — 36415 COLL VENOUS BLD VENIPUNCTURE: CPT

## 2025-08-15 ENCOUNTER — TELEPHONE (OUTPATIENT)
Dept: HEPATOLOGY | Facility: CLINIC | Age: 50
End: 2025-08-15
Payer: MEDICAID

## 2025-08-15 LAB — HCV RNA SERPL NAA+PROBE-LOG IU: NOT DETECTED {LOG_IU}/ML

## 2025-08-18 RX ORDER — VELPATASVIR AND SOFOSBUVIR 100; 400 MG/1; MG/1
1 TABLET, FILM COATED ORAL DAILY
Qty: 28 TABLET | Refills: 2 | OUTPATIENT
Start: 2025-08-18

## 2025-08-19 ENCOUNTER — TELEPHONE (OUTPATIENT)
Dept: HEPATOLOGY | Facility: CLINIC | Age: 50
End: 2025-08-19
Payer: MEDICAID